# Patient Record
Sex: MALE | Race: WHITE | NOT HISPANIC OR LATINO | URBAN - METROPOLITAN AREA
[De-identification: names, ages, dates, MRNs, and addresses within clinical notes are randomized per-mention and may not be internally consistent; named-entity substitution may affect disease eponyms.]

---

## 2017-01-05 ENCOUNTER — EMERGENCY (EMERGENCY)
Facility: HOSPITAL | Age: 57
LOS: 1 days | Discharge: HOME | End: 2017-01-05
Attending: EMERGENCY MEDICINE | Admitting: EMERGENCY MEDICINE
Payer: MEDICARE

## 2017-01-05 VITALS
TEMPERATURE: 98 F | OXYGEN SATURATION: 98 % | HEART RATE: 82 BPM | RESPIRATION RATE: 17 BRPM | SYSTOLIC BLOOD PRESSURE: 152 MMHG | DIASTOLIC BLOOD PRESSURE: 79 MMHG

## 2017-01-05 DIAGNOSIS — Z79.899 OTHER LONG TERM (CURRENT) DRUG THERAPY: ICD-10-CM

## 2017-01-05 DIAGNOSIS — Z79.2 LONG TERM (CURRENT) USE OF ANTIBIOTICS: ICD-10-CM

## 2017-01-05 DIAGNOSIS — Z88.8 ALLERGY STATUS TO OTHER DRUGS, MEDICAMENTS AND BIOLOGICAL SUBSTANCES STATUS: ICD-10-CM

## 2017-01-05 DIAGNOSIS — M79.672 PAIN IN LEFT FOOT: ICD-10-CM

## 2017-01-05 DIAGNOSIS — L03.116 CELLULITIS OF LEFT LOWER LIMB: ICD-10-CM

## 2017-01-05 DIAGNOSIS — B20 HUMAN IMMUNODEFICIENCY VIRUS [HIV] DISEASE: ICD-10-CM

## 2017-01-05 PROCEDURE — 99282 EMERGENCY DEPT VISIT SF MDM: CPT

## 2017-01-05 NOTE — ED PROVIDER NOTE - OBJECTIVE STATEMENT
56y M Pt with PMHx HIV, HTN presents to ED with worsening pain in left 5th toe x3 days. Does not radiate. Blister appeared on lateral left 5th toe yesterday. Denies injury, drainage, and bleeding. Pt states glucose levels recently checked and normal. 56y M Pt with PMHx HIV, HTN presents to ED with worsening pain in left 5th toe x3 days. Does not radiate. Blister appeared on lateral left 5th toe yesterday. Denies injury, drainage, and bleeding. Pt states glucose levels recently checked and normal.  History of HIV, with undetectable viral load x many years.  Last CD4 was >500 as per patient.

## 2017-01-05 NOTE — ED PROVIDER NOTE - NS ED MD SCRIBE ATTENDING SCRIBE SECTIONS
PAST MEDICAL/SURGICAL/SOCIAL HISTORY/VITAL SIGNS( Pullset)/REVIEW OF SYSTEMS/HISTORY OF PRESENT ILLNESS/PHYSICAL EXAM/HIV

## 2017-01-05 NOTE — ED PROVIDER NOTE - SKIN, MLM
Small blistered lesion on lateral aspect of the base of the 5th toe with tenderness on the plantar surface. Mild erythema. Small blistered lesion on lateral aspect of the base of the 5th toe with tenderness on the plantar surface. Mild erythema.  Otherwise, no skin lesions.

## 2017-01-05 NOTE — ED ADULT NURSE NOTE - PMH
Avascular necrosis of bone of hip, right    Bipolar disorder    HIV (human immunodeficiency virus infection)    HTN (hypertension)

## 2017-01-05 NOTE — ED PROVIDER NOTE - MEDICAL DECISION MAKING DETAILS
Patient with painful blistered lesion and mild erythema.  Will treat with ABx, given location and pt with history of HIV/AIDS (thought good CD4 count now).

## 2017-01-22 ENCOUNTER — EMERGENCY (EMERGENCY)
Facility: HOSPITAL | Age: 57
LOS: 1 days | Discharge: HOME | End: 2017-01-22
Attending: EMERGENCY MEDICINE | Admitting: EMERGENCY MEDICINE
Payer: MEDICARE

## 2017-01-22 VITALS
TEMPERATURE: 98 F | RESPIRATION RATE: 16 BRPM | HEART RATE: 68 BPM | DIASTOLIC BLOOD PRESSURE: 86 MMHG | OXYGEN SATURATION: 97 % | SYSTOLIC BLOOD PRESSURE: 159 MMHG

## 2017-01-22 VITALS — WEIGHT: 190.04 LBS

## 2017-01-22 DIAGNOSIS — Z79.899 OTHER LONG TERM (CURRENT) DRUG THERAPY: ICD-10-CM

## 2017-01-22 DIAGNOSIS — Z79.2 LONG TERM (CURRENT) USE OF ANTIBIOTICS: ICD-10-CM

## 2017-01-22 DIAGNOSIS — M79.89 OTHER SPECIFIED SOFT TISSUE DISORDERS: ICD-10-CM

## 2017-01-22 DIAGNOSIS — B20 HUMAN IMMUNODEFICIENCY VIRUS [HIV] DISEASE: ICD-10-CM

## 2017-01-22 DIAGNOSIS — E78.5 HYPERLIPIDEMIA, UNSPECIFIED: ICD-10-CM

## 2017-01-22 DIAGNOSIS — R59.1 GENERALIZED ENLARGED LYMPH NODES: ICD-10-CM

## 2017-01-22 DIAGNOSIS — I10 ESSENTIAL (PRIMARY) HYPERTENSION: ICD-10-CM

## 2017-01-22 DIAGNOSIS — Z88.8 ALLERGY STATUS TO OTHER DRUGS, MEDICAMENTS AND BIOLOGICAL SUBSTANCES STATUS: ICD-10-CM

## 2017-01-22 PROCEDURE — 99282 EMERGENCY DEPT VISIT SF MDM: CPT

## 2017-01-22 NOTE — ED PROVIDER NOTE - OBJECTIVE STATEMENT
57 y/o male pt, hx of HIV, HTN, hyperlipidemia, on meds, with several days of R groin swollen lymph node. Saw a dr beforehand, told he needed primaryc are follow up and probably a node biopsy, here to be "checked out" and get a second opinion, wants primary care info as his PMD is out of town. PT has had no leg swelling, no calf pain today, no fever, no chills, no evan pain, no SOB, no recent infections, no abd pain, no nausea, no vomiting. Hx of lipomas

## 2017-01-22 NOTE — ED PROVIDER NOTE - MEDICAL DECISION MAKING DETAILS
isolated lymphadenopathy, no signs of leg cellulitis, no swelling, no concern for DVT at this time, pt is active and exercised this AM, no distress, will provide info for pimary care. needs work up for lymphadenopathy, does not seem related to any acute infectious process.

## 2017-02-06 ENCOUNTER — APPOINTMENT (OUTPATIENT)
Dept: INTERNAL MEDICINE | Facility: CLINIC | Age: 57
End: 2017-02-06

## 2017-05-16 ENCOUNTER — EMERGENCY (EMERGENCY)
Facility: HOSPITAL | Age: 57
LOS: 1 days | Discharge: HOME | End: 2017-05-16
Attending: EMERGENCY MEDICINE | Admitting: EMERGENCY MEDICINE
Payer: MEDICARE

## 2017-05-16 VITALS
OXYGEN SATURATION: 97 % | WEIGHT: 195.11 LBS | RESPIRATION RATE: 16 BRPM | DIASTOLIC BLOOD PRESSURE: 81 MMHG | TEMPERATURE: 98 F | SYSTOLIC BLOOD PRESSURE: 134 MMHG | HEART RATE: 70 BPM | HEIGHT: 70 IN

## 2017-05-16 DIAGNOSIS — Z79.2 LONG TERM (CURRENT) USE OF ANTIBIOTICS: ICD-10-CM

## 2017-05-16 DIAGNOSIS — E78.5 HYPERLIPIDEMIA, UNSPECIFIED: ICD-10-CM

## 2017-05-16 DIAGNOSIS — B20 HUMAN IMMUNODEFICIENCY VIRUS [HIV] DISEASE: ICD-10-CM

## 2017-05-16 DIAGNOSIS — J02.9 ACUTE PHARYNGITIS, UNSPECIFIED: ICD-10-CM

## 2017-05-16 DIAGNOSIS — I10 ESSENTIAL (PRIMARY) HYPERTENSION: ICD-10-CM

## 2017-05-16 DIAGNOSIS — Z79.899 OTHER LONG TERM (CURRENT) DRUG THERAPY: ICD-10-CM

## 2017-05-16 DIAGNOSIS — Z88.8 ALLERGY STATUS TO OTHER DRUGS, MEDICAMENTS AND BIOLOGICAL SUBSTANCES STATUS: ICD-10-CM

## 2017-05-16 DIAGNOSIS — J02.0 STREPTOCOCCAL PHARYNGITIS: ICD-10-CM

## 2017-05-16 LAB — S PYO AG SPEC QL IA: NEGATIVE — SIGNIFICANT CHANGE UP

## 2017-05-16 PROCEDURE — 99283 EMERGENCY DEPT VISIT LOW MDM: CPT

## 2017-05-16 RX ORDER — AZITHROMYCIN 500 MG/1
500 TABLET, FILM COATED ORAL ONCE
Qty: 0 | Refills: 0 | Status: COMPLETED | OUTPATIENT
Start: 2017-05-16 | End: 2017-05-16

## 2017-05-16 RX ORDER — DEXAMETHASONE 0.5 MG/5ML
4 ELIXIR ORAL ONCE
Qty: 0 | Refills: 0 | Status: COMPLETED | OUTPATIENT
Start: 2017-05-16 | End: 2017-05-16

## 2017-05-16 RX ORDER — IBUPROFEN 200 MG
1 TABLET ORAL
Qty: 15 | Refills: 0 | OUTPATIENT
Start: 2017-05-16 | End: 2017-05-19

## 2017-05-16 RX ORDER — IBUPROFEN 200 MG
600 TABLET ORAL ONCE
Qty: 0 | Refills: 0 | Status: COMPLETED | OUTPATIENT
Start: 2017-05-16 | End: 2017-05-16

## 2017-05-16 RX ORDER — AZITHROMYCIN 500 MG/1
1 TABLET, FILM COATED ORAL
Qty: 3 | Refills: 0 | OUTPATIENT
Start: 2017-05-16 | End: 2017-05-19

## 2017-05-16 RX ADMIN — Medication 600 MILLIGRAM(S): at 15:32

## 2017-05-16 RX ADMIN — AZITHROMYCIN 500 MILLIGRAM(S): 500 TABLET, FILM COATED ORAL at 15:32

## 2017-05-16 RX ADMIN — Medication 4 MILLIGRAM(S): at 15:32

## 2017-05-16 NOTE — ED ADULT NURSE NOTE - CHPI ED SYMPTOMS NEG
no syncope/no vomiting/no change in level of consciousness/no chills/no nausea/no weakness/no loss of consciousness/no blurred vision/no numbness

## 2017-05-16 NOTE — ED PROVIDER NOTE - ENMT, MLM
Airway patent, Nasal mucosa clear. Mouth with normal mucosa. Throat has no vesicles, slight erythematous b/l posterior pharynx with no fluctuance, vesicles, or edema, no thrush noted, airway patent, no drooling, phonating well

## 2017-05-16 NOTE — ED ADULT NURSE NOTE - OBJECTIVE STATEMENT
Pt came in c/o sore throat and difficulty swallowing x1day. Pt denies any CP/SOB/cough. Reports fever of 101.3 last night, took 650mg of Tylenol which helped. Pt is HIV positive, last Tcell count was 1066. Pt denies any N/V/D. Back of throat looks asymptomatic, no edema or redness present at this time, pt does not have tonsils.

## 2017-05-16 NOTE — ED PROVIDER NOTE - MEDICAL DECISION MAKING DETAILS
pt with sore throat, +fever, absence of cough, and +enlarged cervical LN, consistent with strep pharyngitis, given dose of azithromycin and motrin, AFVSS at time of d/c, pt non-toxic appearing and hemodynamically stable, results, ddx, and f/u plans discussed with pt at bedside, d/c'd home to f/u with PMD/ENT, strict return precautions discussed, prompt return to ER for any worsening or new sx, pt verbalized understanding.

## 2017-05-16 NOTE — ED PROVIDER NOTE - OBJECTIVE STATEMENT
56 yo M with PMHx of HIV, last CD4 1099, VL undetectable on HAART, HTN, HLD, and AVN s/p hip replacement, presenting c/o sore throat x 2d 58 yo M with PMHx of HIV, last CD4 1099, VL undetectable on HAART, HTN, HLD, and AVN s/p hip replacement, presenting c/o sore throat x 2d. Pt reports swimming in a pool few days ago, started developing a sore throat one day later with fever, Tmax 101.5, chills, and pain with swallowing.  Pain is worse this morning.  Denies cough, change in phonation, drooling, stridors, SOB, CP, palpitations, wheezing, abdominal pain, N/V/D/C, change in urinary/bowel function, rash and malaise.  No recent sick contact noted

## 2017-05-18 LAB
CULTURE RESULTS: SIGNIFICANT CHANGE UP
SPECIMEN SOURCE: SIGNIFICANT CHANGE UP

## 2017-06-21 ENCOUNTER — EMERGENCY (EMERGENCY)
Facility: HOSPITAL | Age: 57
LOS: 1 days | Discharge: HOME | End: 2017-06-21
Attending: EMERGENCY MEDICINE | Admitting: EMERGENCY MEDICINE
Payer: MEDICARE

## 2017-06-21 VITALS
OXYGEN SATURATION: 99 % | TEMPERATURE: 99 F | HEART RATE: 76 BPM | DIASTOLIC BLOOD PRESSURE: 77 MMHG | SYSTOLIC BLOOD PRESSURE: 134 MMHG | RESPIRATION RATE: 18 BRPM

## 2017-06-21 DIAGNOSIS — Z79.1 LONG TERM (CURRENT) USE OF NON-STEROIDAL ANTI-INFLAMMATORIES (NSAID): ICD-10-CM

## 2017-06-21 DIAGNOSIS — J20.9 ACUTE BRONCHITIS, UNSPECIFIED: ICD-10-CM

## 2017-06-21 DIAGNOSIS — Z79.2 LONG TERM (CURRENT) USE OF ANTIBIOTICS: ICD-10-CM

## 2017-06-21 DIAGNOSIS — B20 HUMAN IMMUNODEFICIENCY VIRUS [HIV] DISEASE: ICD-10-CM

## 2017-06-21 DIAGNOSIS — I10 ESSENTIAL (PRIMARY) HYPERTENSION: ICD-10-CM

## 2017-06-21 DIAGNOSIS — Z79.899 OTHER LONG TERM (CURRENT) DRUG THERAPY: ICD-10-CM

## 2017-06-21 DIAGNOSIS — R05 COUGH: ICD-10-CM

## 2017-06-21 DIAGNOSIS — Z88.8 ALLERGY STATUS TO OTHER DRUGS, MEDICAMENTS AND BIOLOGICAL SUBSTANCES STATUS: ICD-10-CM

## 2017-06-21 PROCEDURE — 71020: CPT | Mod: 26

## 2017-06-21 PROCEDURE — 93010 ELECTROCARDIOGRAM REPORT: CPT

## 2017-06-21 PROCEDURE — 99284 EMERGENCY DEPT VISIT MOD MDM: CPT | Mod: 25

## 2017-06-21 RX ORDER — AZITHROMYCIN 500 MG/1
1 TABLET, FILM COATED ORAL
Qty: 3 | Refills: 0 | OUTPATIENT
Start: 2017-06-21 | End: 2017-06-24

## 2017-06-21 RX ORDER — ALBUTEROL 90 UG/1
2 AEROSOL, METERED ORAL
Qty: 1 | Refills: 0 | OUTPATIENT
Start: 2017-06-21 | End: 2017-07-21

## 2017-06-21 RX ORDER — LORATADINE 10 MG/1
1 TABLET ORAL
Qty: 10 | Refills: 0 | OUTPATIENT
Start: 2017-06-21 | End: 2017-07-01

## 2017-06-21 NOTE — ED PROVIDER NOTE - OBJECTIVE STATEMENT
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

## 2017-06-26 ENCOUNTER — INPATIENT (INPATIENT)
Facility: HOSPITAL | Age: 57
LOS: 3 days | Discharge: HOME | DRG: 165 | End: 2017-06-30
Attending: THORACIC SURGERY (CARDIOTHORACIC VASCULAR SURGERY) | Admitting: THORACIC SURGERY (CARDIOTHORACIC VASCULAR SURGERY)
Payer: MEDICARE

## 2017-06-26 VITALS
TEMPERATURE: 99 F | SYSTOLIC BLOOD PRESSURE: 137 MMHG | OXYGEN SATURATION: 97 % | DIASTOLIC BLOOD PRESSURE: 86 MMHG | RESPIRATION RATE: 20 BRPM | HEART RATE: 73 BPM | WEIGHT: 195.99 LBS

## 2017-06-26 DIAGNOSIS — Z98.890 OTHER SPECIFIED POSTPROCEDURAL STATES: Chronic | ICD-10-CM

## 2017-06-26 LAB
ALBUMIN SERPL ELPH-MCNC: 3.8 G/DL — SIGNIFICANT CHANGE UP (ref 3.3–5)
ALBUMIN SERPL ELPH-MCNC: 4.5 G/DL — SIGNIFICANT CHANGE UP (ref 3.4–5)
ALP SERPL-CCNC: 51 U/L — SIGNIFICANT CHANGE UP (ref 40–120)
ALP SERPL-CCNC: 58 U/L — SIGNIFICANT CHANGE UP (ref 40–120)
ALT FLD-CCNC: 16 U/L — SIGNIFICANT CHANGE UP (ref 10–45)
ALT FLD-CCNC: 29 U/L — SIGNIFICANT CHANGE UP (ref 12–42)
ANION GAP SERPL CALC-SCNC: 12 MMOL/L — SIGNIFICANT CHANGE UP (ref 5–17)
ANION GAP SERPL CALC-SCNC: 9 MMOL/L — SIGNIFICANT CHANGE UP (ref 9–16)
APPEARANCE UR: CLEAR — SIGNIFICANT CHANGE UP
APTT BLD: 33.4 SEC — SIGNIFICANT CHANGE UP (ref 27.5–37.4)
AST SERPL-CCNC: 21 U/L — SIGNIFICANT CHANGE UP (ref 10–40)
AST SERPL-CCNC: 27 U/L — SIGNIFICANT CHANGE UP (ref 15–37)
BASOPHILS NFR BLD AUTO: 0.7 % — SIGNIFICANT CHANGE UP (ref 0–2)
BILIRUB SERPL-MCNC: 0.5 MG/DL — SIGNIFICANT CHANGE UP (ref 0.2–1.2)
BILIRUB SERPL-MCNC: 0.8 MG/DL — SIGNIFICANT CHANGE UP (ref 0.2–1.2)
BILIRUB UR-MCNC: NEGATIVE — SIGNIFICANT CHANGE UP
BLD GP AB SCN SERPL QL: NEGATIVE — SIGNIFICANT CHANGE UP
BUN SERPL-MCNC: 18 MG/DL — SIGNIFICANT CHANGE UP (ref 7–23)
BUN SERPL-MCNC: 24 MG/DL — HIGH (ref 7–23)
CALCIUM SERPL-MCNC: 9.1 MG/DL — SIGNIFICANT CHANGE UP (ref 8.4–10.5)
CALCIUM SERPL-MCNC: 9.6 MG/DL — SIGNIFICANT CHANGE UP (ref 8.5–10.5)
CHLORIDE SERPL-SCNC: 100 MMOL/L — SIGNIFICANT CHANGE UP (ref 96–108)
CHLORIDE SERPL-SCNC: 104 MMOL/L — SIGNIFICANT CHANGE UP (ref 96–108)
CHOLEST SERPL-MCNC: 146 MG/DL — SIGNIFICANT CHANGE UP (ref 10–199)
CO2 SERPL-SCNC: 26 MMOL/L — SIGNIFICANT CHANGE UP (ref 22–31)
CO2 SERPL-SCNC: 28 MMOL/L — SIGNIFICANT CHANGE UP (ref 22–31)
COLOR SPEC: YELLOW — SIGNIFICANT CHANGE UP
CREAT SERPL-MCNC: 1.3 MG/DL — SIGNIFICANT CHANGE UP (ref 0.5–1.3)
CREAT SERPL-MCNC: 1.67 MG/DL — HIGH (ref 0.5–1.3)
DIFF PNL FLD: NEGATIVE — SIGNIFICANT CHANGE UP
EOSINOPHIL NFR BLD AUTO: 2.4 % — SIGNIFICANT CHANGE UP (ref 0–6)
GLUCOSE SERPL-MCNC: 121 MG/DL — HIGH (ref 70–99)
GLUCOSE SERPL-MCNC: 122 MG/DL — HIGH (ref 70–99)
GLUCOSE UR QL: NEGATIVE — SIGNIFICANT CHANGE UP
HBA1C BLD-MCNC: 5.3 % — SIGNIFICANT CHANGE UP (ref 4–5.6)
HCT VFR BLD CALC: 42.3 % — SIGNIFICANT CHANGE UP (ref 39–50)
HCT VFR BLD CALC: 45 % — SIGNIFICANT CHANGE UP (ref 39–50)
HDLC SERPL-MCNC: 33 MG/DL — LOW (ref 40–125)
HGB BLD-MCNC: 14.4 G/DL — SIGNIFICANT CHANGE UP (ref 13–17)
HGB BLD-MCNC: 15.7 G/DL — SIGNIFICANT CHANGE UP (ref 13–17)
IMM GRANULOCYTES NFR BLD AUTO: 0.5 % — SIGNIFICANT CHANGE UP (ref 0–1.5)
INR BLD: 1.02 — SIGNIFICANT CHANGE UP (ref 0.88–1.16)
INR BLD: 1.02 — SIGNIFICANT CHANGE UP (ref 0.88–1.16)
KETONES UR-MCNC: NEGATIVE — SIGNIFICANT CHANGE UP
LACTATE SERPL-SCNC: 1.3 MMOL/L — SIGNIFICANT CHANGE UP (ref 0.4–2)
LDH SERPL L TO P-CCNC: 205 U/L — SIGNIFICANT CHANGE UP (ref 50–242)
LEUKOCYTE ESTERASE UR-ACNC: NEGATIVE — SIGNIFICANT CHANGE UP
LIPID PNL WITH DIRECT LDL SERPL: 74 MG/DL — SIGNIFICANT CHANGE UP
LYMPHOCYTES # BLD AUTO: 36.3 % — SIGNIFICANT CHANGE UP (ref 13–44)
MAGNESIUM SERPL-MCNC: 2 MG/DL — SIGNIFICANT CHANGE UP (ref 1.6–2.6)
MCHC RBC-ENTMCNC: 28.8 PG — SIGNIFICANT CHANGE UP (ref 27–34)
MCHC RBC-ENTMCNC: 29.8 PG — SIGNIFICANT CHANGE UP (ref 27–34)
MCHC RBC-ENTMCNC: 34 G/DL — SIGNIFICANT CHANGE UP (ref 32–36)
MCHC RBC-ENTMCNC: 34.9 G/DL — SIGNIFICANT CHANGE UP (ref 32–36)
MCV RBC AUTO: 84.6 FL — SIGNIFICANT CHANGE UP (ref 80–100)
MCV RBC AUTO: 85.6 FL — SIGNIFICANT CHANGE UP (ref 80–100)
MONOCYTES NFR BLD AUTO: 8.8 % — SIGNIFICANT CHANGE UP (ref 2–14)
NEUTROPHILS NFR BLD AUTO: 51.3 % — SIGNIFICANT CHANGE UP (ref 43–77)
NITRITE UR-MCNC: NEGATIVE — SIGNIFICANT CHANGE UP
NT-PROBNP SERPL-SCNC: 62 PG/ML — SIGNIFICANT CHANGE UP (ref 0–300)
NT-PROBNP SERPL-SCNC: 79 PG/ML — SIGNIFICANT CHANGE UP
PH UR: 6.5 — SIGNIFICANT CHANGE UP (ref 5–8)
PHOSPHATE SERPL-MCNC: 2.7 MG/DL — SIGNIFICANT CHANGE UP (ref 2.5–4.5)
PLATELET # BLD AUTO: 208 K/UL — SIGNIFICANT CHANGE UP (ref 150–400)
PLATELET # BLD AUTO: 250 K/UL — SIGNIFICANT CHANGE UP (ref 150–400)
POTASSIUM SERPL-MCNC: 3.5 MMOL/L — SIGNIFICANT CHANGE UP (ref 3.5–5.3)
POTASSIUM SERPL-MCNC: 3.7 MMOL/L — SIGNIFICANT CHANGE UP (ref 3.5–5.3)
POTASSIUM SERPL-SCNC: 3.5 MMOL/L — SIGNIFICANT CHANGE UP (ref 3.5–5.3)
POTASSIUM SERPL-SCNC: 3.7 MMOL/L — SIGNIFICANT CHANGE UP (ref 3.5–5.3)
PROT SERPL-MCNC: 6.4 G/DL — SIGNIFICANT CHANGE UP (ref 6–8.3)
PROT SERPL-MCNC: 8.3 G/DL — HIGH (ref 6.4–8.2)
PROT UR-MCNC: NEGATIVE MG/DL — SIGNIFICANT CHANGE UP
PROTHROM AB SERPL-ACNC: 11.2 SEC — SIGNIFICANT CHANGE UP (ref 9.8–12.7)
PROTHROM AB SERPL-ACNC: 11.3 SEC — SIGNIFICANT CHANGE UP (ref 9.8–12.7)
RBC # BLD: 5 M/UL — SIGNIFICANT CHANGE UP (ref 4.2–5.8)
RBC # BLD: 5.26 M/UL — SIGNIFICANT CHANGE UP (ref 4.2–5.8)
RBC # FLD: 12.2 % — SIGNIFICANT CHANGE UP (ref 10.3–16.9)
RBC # FLD: 12.5 % — SIGNIFICANT CHANGE UP (ref 10.3–16.9)
RH IG SCN BLD-IMP: POSITIVE — SIGNIFICANT CHANGE UP
SODIUM SERPL-SCNC: 138 MMOL/L — SIGNIFICANT CHANGE UP (ref 135–145)
SODIUM SERPL-SCNC: 141 MMOL/L — SIGNIFICANT CHANGE UP (ref 132–145)
SP GR SPEC: 1.01 — SIGNIFICANT CHANGE UP (ref 1–1.03)
TOTAL CHOLESTEROL/HDL RATIO MEASUREMENT: 4.4 RATIO — SIGNIFICANT CHANGE UP (ref 3.4–9.6)
TRIGL SERPL-MCNC: 197 MG/DL — HIGH (ref 10–149)
TROPONIN I SERPL-MCNC: 0.02 NG/ML — SIGNIFICANT CHANGE UP (ref 0.02–0.06)
TSH SERPL-MCNC: 1.1 UIU/ML — SIGNIFICANT CHANGE UP (ref 0.35–4.94)
UROBILINOGEN FLD QL: 0.2 E.U./DL — SIGNIFICANT CHANGE UP
WBC # BLD: 8.7 K/UL — SIGNIFICANT CHANGE UP (ref 3.8–10.5)
WBC # BLD: 9.6 K/UL — SIGNIFICANT CHANGE UP (ref 3.8–10.5)
WBC # FLD AUTO: 8.7 K/UL — SIGNIFICANT CHANGE UP (ref 3.8–10.5)
WBC # FLD AUTO: 9.6 K/UL — SIGNIFICANT CHANGE UP (ref 3.8–10.5)

## 2017-06-26 PROCEDURE — 32551 INSERTION OF CHEST TUBE: CPT

## 2017-06-26 PROCEDURE — 71010: CPT | Mod: 26,59

## 2017-06-26 PROCEDURE — 93010 ELECTROCARDIOGRAM REPORT: CPT

## 2017-06-26 PROCEDURE — 71020: CPT | Mod: 26

## 2017-06-26 PROCEDURE — 71020: CPT | Mod: 26,59

## 2017-06-26 PROCEDURE — 99152 MOD SED SAME PHYS/QHP 5/>YRS: CPT | Mod: 59

## 2017-06-26 PROCEDURE — 71250 CT THORAX DX C-: CPT | Mod: 26

## 2017-06-26 PROCEDURE — 99285 EMERGENCY DEPT VISIT HI MDM: CPT | Mod: 25

## 2017-06-26 RX ORDER — HYDROMORPHONE HYDROCHLORIDE 2 MG/ML
0.5 INJECTION INTRAMUSCULAR; INTRAVENOUS; SUBCUTANEOUS EVERY 4 HOURS
Qty: 0 | Refills: 0 | Status: DISCONTINUED | OUTPATIENT
Start: 2017-06-26 | End: 2017-06-27

## 2017-06-26 RX ORDER — IBUPROFEN 200 MG
600 TABLET ORAL EVERY 6 HOURS
Qty: 0 | Refills: 0 | Status: DISCONTINUED | OUTPATIENT
Start: 2017-06-26 | End: 2017-06-28

## 2017-06-26 RX ORDER — RITONAVIR 100 MG/1
100 TABLET, FILM COATED ORAL DAILY
Qty: 0 | Refills: 0 | Status: DISCONTINUED | OUTPATIENT
Start: 2017-06-26 | End: 2017-06-28

## 2017-06-26 RX ORDER — HYDROMORPHONE HYDROCHLORIDE 2 MG/ML
0.5 INJECTION INTRAMUSCULAR; INTRAVENOUS; SUBCUTANEOUS ONCE
Qty: 0 | Refills: 0 | Status: DISCONTINUED | OUTPATIENT
Start: 2017-06-26 | End: 2017-06-26

## 2017-06-26 RX ORDER — ATENOLOL 25 MG/1
50 TABLET ORAL DAILY
Qty: 0 | Refills: 0 | Status: DISCONTINUED | OUTPATIENT
Start: 2017-06-26 | End: 2017-06-28

## 2017-06-26 RX ORDER — PANTOPRAZOLE SODIUM 20 MG/1
40 TABLET, DELAYED RELEASE ORAL
Qty: 0 | Refills: 0 | Status: DISCONTINUED | OUTPATIENT
Start: 2017-06-26 | End: 2017-06-28

## 2017-06-26 RX ORDER — DOLUTEGRAVIR SODIUM 25 MG/1
50 TABLET, FILM COATED ORAL DAILY
Qty: 0 | Refills: 0 | Status: DISCONTINUED | OUTPATIENT
Start: 2017-06-26 | End: 2017-06-28

## 2017-06-26 RX ORDER — DARUNAVIR 75 MG/1
800 TABLET, FILM COATED ORAL DAILY
Qty: 0 | Refills: 0 | Status: DISCONTINUED | OUTPATIENT
Start: 2017-06-26 | End: 2017-06-28

## 2017-06-26 RX ORDER — LIDOCAINE 4 G/100G
1 CREAM TOPICAL EVERY 24 HOURS
Qty: 0 | Refills: 0 | Status: DISCONTINUED | OUTPATIENT
Start: 2017-06-26 | End: 2017-06-28

## 2017-06-26 RX ORDER — MORPHINE SULFATE 50 MG/1
4 CAPSULE, EXTENDED RELEASE ORAL ONCE
Qty: 0 | Refills: 0 | Status: DISCONTINUED | OUTPATIENT
Start: 2017-06-26 | End: 2017-06-26

## 2017-06-26 RX ORDER — PROPOFOL 10 MG/ML
50 INJECTION, EMULSION INTRAVENOUS ONCE
Qty: 0 | Refills: 0 | Status: COMPLETED | OUTPATIENT
Start: 2017-06-26 | End: 2017-06-26

## 2017-06-26 RX ORDER — SODIUM CHLORIDE 9 MG/ML
3 INJECTION INTRAMUSCULAR; INTRAVENOUS; SUBCUTANEOUS EVERY 8 HOURS
Qty: 0 | Refills: 0 | Status: DISCONTINUED | OUTPATIENT
Start: 2017-06-26 | End: 2017-06-28

## 2017-06-26 RX ORDER — LAMOTRIGINE 25 MG/1
300 TABLET, ORALLY DISINTEGRATING ORAL DAILY
Qty: 0 | Refills: 0 | Status: DISCONTINUED | OUTPATIENT
Start: 2017-06-26 | End: 2017-06-28

## 2017-06-26 RX ORDER — DIPHENHYDRAMINE HCL 50 MG
25 CAPSULE ORAL ONCE
Qty: 0 | Refills: 0 | Status: COMPLETED | OUTPATIENT
Start: 2017-06-26 | End: 2017-06-26

## 2017-06-26 RX ORDER — SPIRONOLACTONE 25 MG/1
25 TABLET, FILM COATED ORAL DAILY
Qty: 0 | Refills: 0 | Status: DISCONTINUED | OUTPATIENT
Start: 2017-06-26 | End: 2017-06-28

## 2017-06-26 RX ORDER — POTASSIUM CHLORIDE 20 MEQ
20 PACKET (EA) ORAL ONCE
Qty: 0 | Refills: 0 | Status: COMPLETED | OUTPATIENT
Start: 2017-06-26 | End: 2017-06-26

## 2017-06-26 RX ORDER — MIDAZOLAM HYDROCHLORIDE 1 MG/ML
2.5 INJECTION, SOLUTION INTRAMUSCULAR; INTRAVENOUS ONCE
Qty: 0 | Refills: 0 | Status: DISCONTINUED | OUTPATIENT
Start: 2017-06-26 | End: 2017-06-26

## 2017-06-26 RX ORDER — BUPRENORPHINE AND NALOXONE 2; .5 MG/1; MG/1
1 TABLET SUBLINGUAL DAILY
Qty: 0 | Refills: 0 | Status: DISCONTINUED | OUTPATIENT
Start: 2017-06-26 | End: 2017-06-28

## 2017-06-26 RX ORDER — HEPARIN SODIUM 5000 [USP'U]/ML
5000 INJECTION INTRAVENOUS; SUBCUTANEOUS EVERY 8 HOURS
Qty: 0 | Refills: 0 | Status: DISCONTINUED | OUTPATIENT
Start: 2017-06-26 | End: 2017-06-28

## 2017-06-26 RX ADMIN — HYDROMORPHONE HYDROCHLORIDE 0.5 MILLIGRAM(S): 2 INJECTION INTRAMUSCULAR; INTRAVENOUS; SUBCUTANEOUS at 22:16

## 2017-06-26 RX ADMIN — LIDOCAINE 1 PATCH: 4 CREAM TOPICAL at 14:12

## 2017-06-26 RX ADMIN — MORPHINE SULFATE 4 MILLIGRAM(S): 50 CAPSULE, EXTENDED RELEASE ORAL at 09:21

## 2017-06-26 RX ADMIN — HYDROMORPHONE HYDROCHLORIDE 0.5 MILLIGRAM(S): 2 INJECTION INTRAMUSCULAR; INTRAVENOUS; SUBCUTANEOUS at 17:25

## 2017-06-26 RX ADMIN — SODIUM CHLORIDE 3 MILLILITER(S): 9 INJECTION INTRAMUSCULAR; INTRAVENOUS; SUBCUTANEOUS at 21:40

## 2017-06-26 RX ADMIN — SODIUM CHLORIDE 3 MILLILITER(S): 9 INJECTION INTRAMUSCULAR; INTRAVENOUS; SUBCUTANEOUS at 14:06

## 2017-06-26 RX ADMIN — MIDAZOLAM HYDROCHLORIDE 2.5 MILLIGRAM(S): 1 INJECTION, SOLUTION INTRAMUSCULAR; INTRAVENOUS at 08:53

## 2017-06-26 RX ADMIN — HYDROMORPHONE HYDROCHLORIDE 0.5 MILLIGRAM(S): 2 INJECTION INTRAMUSCULAR; INTRAVENOUS; SUBCUTANEOUS at 11:09

## 2017-06-26 RX ADMIN — HYDROMORPHONE HYDROCHLORIDE 0.5 MILLIGRAM(S): 2 INJECTION INTRAMUSCULAR; INTRAVENOUS; SUBCUTANEOUS at 09:47

## 2017-06-26 RX ADMIN — MIDAZOLAM HYDROCHLORIDE 2.5 MILLIGRAM(S): 1 INJECTION, SOLUTION INTRAMUSCULAR; INTRAVENOUS at 09:00

## 2017-06-26 RX ADMIN — Medication 20 MILLIEQUIVALENT(S): at 21:15

## 2017-06-26 RX ADMIN — Medication 25 MILLIGRAM(S): at 21:15

## 2017-06-26 RX ADMIN — HYDROMORPHONE HYDROCHLORIDE 0.5 MILLIGRAM(S): 2 INJECTION INTRAMUSCULAR; INTRAVENOUS; SUBCUTANEOUS at 10:20

## 2017-06-26 RX ADMIN — HYDROMORPHONE HYDROCHLORIDE 0.5 MILLIGRAM(S): 2 INJECTION INTRAMUSCULAR; INTRAVENOUS; SUBCUTANEOUS at 18:29

## 2017-06-26 RX ADMIN — HEPARIN SODIUM 5000 UNIT(S): 5000 INJECTION INTRAVENOUS; SUBCUTANEOUS at 21:15

## 2017-06-26 RX ADMIN — HYDROMORPHONE HYDROCHLORIDE 0.5 MILLIGRAM(S): 2 INJECTION INTRAMUSCULAR; INTRAVENOUS; SUBCUTANEOUS at 22:40

## 2017-06-26 RX ADMIN — MORPHINE SULFATE 4 MILLIGRAM(S): 50 CAPSULE, EXTENDED RELEASE ORAL at 10:20

## 2017-06-26 RX ADMIN — PROPOFOL 50 MILLIGRAM(S): 10 INJECTION, EMULSION INTRAVENOUS at 09:03

## 2017-06-26 NOTE — H&P ADULT - ASSESSMENT
Assessment  57 year old gentleman, a remote smoker 17 years ago, ETOH/Crack/Marijuana 30years ago with a past medical history pertinent for chronic bronchitis, HLD, HTN, GERD, Mac's Esophagus, Anxiety, Bipolar disorder, narcotics dependent secondary to laminectomy x2 (2001), R. hip surgery x3 (2014) secondary to staph infection/vascular necrosis, s/p carpel tunnel surgery feb/march 2017 who reported he was being treated for bronchitis with a Z-Pac since last thursday 6/22/17 and became acutely SOB while climbing stairs today. He presented to [facility] and CXR revealed he had a large left pneumothorax for which a pigtail was placed.  Patient was transferred to [facility] for further evaluation.    Plan:  Neurovascular:   -Pain well control with suboxone 2mg daily, dilaudid 0.5mg prn      Cardiovascular:   Hemodynamically stable.   -BP. HR. EKG. TTE. Cardiac Panel. Lipid Panel. BNP.   -restart atenolol, thiazide      Respiratory:   02 Sat = 98% on RA.  -cont chest tube on suction  -am CXR     GI: Stable.  -PPX.  -PO Diet.    Renal / :  -Monitor renal function.  -Monitor I/O's.    Endocrine:    -A1c.  -TSH.    Hematologic:  -CBC  -coags    ID:  -trend wbc    Prophylaxis:  -DVT prophylaxis with 5000 SubQ Heparin q8h.  -SCD's    Disposition:  -Possible surgical intervention if PTx does not resolve

## 2017-06-26 NOTE — H&P ADULT - NSHPREVIEWOFSYSTEMS_GEN_ALL_CORE
Review of Systems  CONSTITUTIONAL:  Denies Fevers / chills, sweats, fatigue, weight loss, weight gain                                      NEURO:  Admits parathesias (b/l hands prior to carpel tunnel surgery) Denies seizures, syncope, confusion                               EYES:  Denies Blurry vision, discharge, pain, loss of vision                                                                                    ENMT:  Denies Difficulty hearing, vertigo, dysphagia, epistaxis, recent dental work                                       CV:  Admits KLINE, Denies Chest pain, palpitations, , orthopnea                                                                                          RESPIRATORY:  Admits to SOB,; Denies Wheezing, cough / sputum, hemoptysis                                                                GI:  Denies Nausea, vomiting, diarrhea, constipation, melena, difficulty swallowing                                               : Denies Hematuria, dysuria, urgency, incontinence                                                                                         MUSKULOSKELETAL: Admits arthritis b/l shoulders, right hip and right knee, joint swelling, denies muscle weakness                 SKIN/BREAST:  Denies rash, itching, mary loss, masses                                                                                            PSYCH:  Admits depresion, anxiety, suicidal ideation  (2003)                                                                                               HEME/LYMPH:  Denies bruises easily, enlarged lymph nodes, tender lymph nodes                                        ENDOCRINE:  Denies cold intolerance, heat intolerance, polydipsia Review of Systems  CONSTITUTIONAL:  Denies Fevers / chills, sweats, fatigue, weight loss, weight gain                                      NEURO:  Admits parathesias (b/l hands prior to carpel tunnel surgery) Denies seizures, syncope, confusion                                EYES:  Denies Blurry vision, discharge, pain, loss of vision                                                                                    ENMT:  Denies Difficulty hearing, vertigo, dysphagia, epistaxis, recent dental work                                       CV:  Admits KLINE, Denies Chest pain, palpitations, , orthopnea                                                                                          RESPIRATORY:  Admits to SOB,; Denies Wheezing, cough / sputum, hemoptysis                                                                GI:  Denies Nausea, vomiting, diarrhea, constipation, melena, difficulty swallowing                                               : Denies Hematuria, dysuria, urgency, incontinence                                                                                         MUSKULOSKELETAL: Admits arthritis b/l shoulders, right hip and right knee, joint swelling, denies muscle weakness                   SKIN/BREAST:  Denies rash, itching, mary loss, masses                                                                                            PSYCH:  Admits depresion, anxiety, suicidal ideation  (2003)                                                                                               HEME/LYMPH:  Denies bruises easily, enlarged lymph nodes, tender lymph nodes                                        ENDOCRINE:  Denies cold intolerance, heat intolerance, polydipsia

## 2017-06-26 NOTE — ED ADULT NURSE REASSESSMENT NOTE - NS ED NURSE REASSESS COMMENT FT1
Patient presents to ED with shortness of breath, pt with spontaneous Left pneumothorax.  Pt s/p left chest tube insertion to 5th intercoastal space, to continuos suction. Chest tube insertion protocol followed. Vitals monitor. (see flow record) Site intact, no subcutaneus crepitus noted. Patient presents to ED with shortness of breath, pt with spontaneous Left pneumothorax.  Pt s/p left chest tube insertion to 5th intercoastal space, to continuos suction. Tolerated well. Chest tube insertion protocol followed. Vitals monitor. (see flow record) Site intact, no subcutaneus crepitus noted. Patient presents to ED with shortness of breath, pt with spontaneous Left pneumothorax.  Pt s/p left chest tube insertion to 5th intercoastal space, to continuos low suction. No drainage /output at this time. out Tolerated well. Chest tube insertion protocol followed. Vitals monitor. (see flow record) Site intact, no subcutaneus crepitus noted.

## 2017-06-26 NOTE — ED PROVIDER NOTE - ATTENDING CONTRIBUTION TO CARE
HIV + male with worsening cough, sob, chest pain/back pain x 4 days.  Seen here for symptoms last week - CXR neg, treated for bronchitis.  Today with total L lung collapse.  No VS derangements.  Speaking in full sentences.  No BS on the L side.  Consented for procedural sedation and pigtail placement.  Patient very anxious and with hx of multiple surgeries with "high pain tolerance" and former alcohol abuse.  Given versed for anxiolysis.  50 mg of propofol for sedation during procedure.  Tolerated well - see procedure note for detail.  Lung reinflated.  Hooked up to water seal.  Accepted by Dr. Curran for admission to CTS service.  Pain medication ordered for post procedure

## 2017-06-26 NOTE — H&P ADULT - HISTORY OF PRESENT ILLNESS
This is a 57 year old gentleman, a remote smoker 17 years ago, ETOH/Crack/Marijuana 30years ago with a past medical history pertinent for chronic bronchitis, HLD, HTN, GERD, Mac's Esophagus, Anxiety, Bipolar disorder, narcotics dependent secondary to laminectomy x2 (2001), R. hip surgery x3 (2014) secondary to staph infection/vascular necrosis, s/p carpel tunnel surgery feb/march 2017 who reported he was being treated for bronchitis with a Z-Pac since last thursday 6/22/17 and became acutely SOB while climbing stairs today. He presented to [facility] and CXR revealed he had a large left pneumothorax for which a pigtail was placed.  Patient was transferred to [facility] for further evaluation.

## 2017-06-26 NOTE — H&P ADULT - PSH
H/O carpal tunnel repair  2017  History of total hip replacement  x 3  Other postprocedural status  S/P laminectomy

## 2017-06-26 NOTE — ED PROCEDURE NOTE - CPROC ED CHEST TUBE DETAIL1
An incision into the lateral chest was made (see location above). A thoracostomy tube was placed into the pleural space (see size above), and connected to a closed drainage canister and water suction. The tube was secured with 00 nylon suture, and the area dressed with petrolatum impregnated gauze bandage. A post procedure chest x-ray was ordered, and proper placement was confirmed.

## 2017-06-26 NOTE — H&P ADULT - NSHPPHYSICALEXAM_GEN_ALL_CORE
Physical Exam  CONSTITUTIONAL:   Patient seen bedside in NAD                                                    NEURO: Alert and oriented with no gross deificit appreciated                                                                                            EYES: PERRL, EOM                                                                                 ENMT:  supple, no enlargement appreciarige  CV:RRR, S1S2, no murmur                                                                                   RESPIRATORY: Left lung with mild crackles, R lung field CTA, no wheeze, no rhonci, Left pigtail in place  GI:soft, nontender, positive bowel sounds                                                                                      : KRAUSE -non in place                                                                  MUSKULOSKELETAL: decrease ROM 2/2 pain b/l upper and lower extremities, no swelling appreciated                                  SKIN / BREAST:                                                                  WNL    Chest tube:on suction, positive intermittent airleak

## 2017-06-26 NOTE — ED PROVIDER NOTE - MEDICAL DECISION MAKING DETAILS
spontaneous pneumo 58 y/o M with pMH of HIV on HAART presents to ED c/o worsening SOB x 4 days with spontaneous pneumo on CXR.  Pt presented well, No tachypnea, accessory muscle use or air hunger.  Pigtail placed with resolution/reduction.  Pt tolerated well with moderate sedation.  Pt discussed with Dr. Mcgill, thoracic surg via transfer center and accepted.

## 2017-06-26 NOTE — H&P ADULT - NSHPLABSRESULTS_GEN_ALL_CORE
CXR 6/26/17 Left chest tube appearing. Left pneumothorax nearly completely resolved.  CT chest 6/26/17  Small residual left ptx, lateral upper lobe paraseptal blebs.

## 2017-06-26 NOTE — ED ADULT NURSE NOTE - OBJECTIVE STATEMENT
Patient complaining of worsening shortness of breath over the last 3 days, recently completed antibiotics with no relief

## 2017-06-26 NOTE — ED PROCEDURE NOTE - ATTENDING CONTRIBUTION TO CARE
pigtail for complete L lung collapse (spontaneous).  Admit to the CTS service at [facility].

## 2017-06-26 NOTE — H&P ADULT - PMH
Arthritis    Avascular necrosis of bone of hip, right    Hou's esophagus    Bipolar disorder    Bronchitis, chronic    Crack cocaine use  30 years ago  ETOH abuse  30 years ago  HIV (human immunodeficiency virus infection)    HTN (hypertension)    Marijuana abuse  30 years ago  Suicidal behavior with attempted self-injury  2003

## 2017-06-26 NOTE — ED PROVIDER NOTE - OBJECTIVE STATEMENT
56 y/o M presents to ED c/o 56 y/o M presents to ED c/o ac 58 y/o M presents to ED c/o worsening shortness of breath x 4 days.  Pt seen in this ED 4 days ago and diagnosed with bronchitis.  He took Zpak with no effect.  He states his SOB worsened after walking  flight of stairs yesterday.  He now has KLINE.  He has noted an upper back ache for 2-3 days which he thinks is due to a sebaceous cyst but states the pain radiates up around the shoulder and down the anterior chest.  He denies fevers/chills, n/v, abd pain, trauma/falls.

## 2017-06-26 NOTE — ED PROCEDURE NOTE - NS_POSTPROCCAREGUIDE_ED_ALL_ED
Patient is now fully awake, with vital signs and temperature stable, hydration is adequate, patients Raegan’s  score is at baseline (or greater than 8), patient and escort has received  discharge education.

## 2017-06-26 NOTE — ED PROVIDER NOTE - DIAGNOSTIC INTERPRETATION
CXR, 2 view:  complete pneumothorax on L side, no tracheal deviation, no cardiomegaly  CXR, portable, 1 view:  resolution/reduction of pneumothorax with pigtail in place.

## 2017-06-26 NOTE — ED ADULT TRIAGE NOTE - CHIEF COMPLAINT QUOTE
Pt reports finishing zithromax a few days ago cough. Complains of increasing shortness of breath over the past few days. Reports history of HIV.

## 2017-06-26 NOTE — ED PROVIDER NOTE - PROGRESS NOTE DETAILS
Pt describes remote history of alcohol abuse and high tolerance to pain medication.  Versed given for anxiolysis prior to procedure.

## 2017-06-27 LAB
ANION GAP SERPL CALC-SCNC: 14 MMOL/L — SIGNIFICANT CHANGE UP (ref 5–17)
BUN SERPL-MCNC: 18 MG/DL — SIGNIFICANT CHANGE UP (ref 7–23)
CALCIUM SERPL-MCNC: 10 MG/DL — SIGNIFICANT CHANGE UP (ref 8.4–10.5)
CHLORIDE SERPL-SCNC: 98 MMOL/L — SIGNIFICANT CHANGE UP (ref 96–108)
CO2 SERPL-SCNC: 25 MMOL/L — SIGNIFICANT CHANGE UP (ref 22–31)
CREAT SERPL-MCNC: 1.2 MG/DL — SIGNIFICANT CHANGE UP (ref 0.5–1.3)
GLUCOSE SERPL-MCNC: 100 MG/DL — HIGH (ref 70–99)
HCT VFR BLD CALC: 45.1 % — SIGNIFICANT CHANGE UP (ref 39–50)
HGB BLD-MCNC: 15.9 G/DL — SIGNIFICANT CHANGE UP (ref 13–17)
MAGNESIUM SERPL-MCNC: 2.2 MG/DL — SIGNIFICANT CHANGE UP (ref 1.6–2.6)
MCHC RBC-ENTMCNC: 29.6 PG — SIGNIFICANT CHANGE UP (ref 27–34)
MCHC RBC-ENTMCNC: 35.3 G/DL — SIGNIFICANT CHANGE UP (ref 32–36)
MCV RBC AUTO: 83.8 FL — SIGNIFICANT CHANGE UP (ref 80–100)
PHOSPHATE SERPL-MCNC: 2.5 MG/DL — SIGNIFICANT CHANGE UP (ref 2.5–4.5)
PLATELET # BLD AUTO: 231 K/UL — SIGNIFICANT CHANGE UP (ref 150–400)
POTASSIUM SERPL-MCNC: 3.8 MMOL/L — SIGNIFICANT CHANGE UP (ref 3.5–5.3)
POTASSIUM SERPL-SCNC: 3.8 MMOL/L — SIGNIFICANT CHANGE UP (ref 3.5–5.3)
RBC # BLD: 5.38 M/UL — SIGNIFICANT CHANGE UP (ref 4.2–5.8)
RBC # FLD: 12.6 % — SIGNIFICANT CHANGE UP (ref 10.3–16.9)
SODIUM SERPL-SCNC: 137 MMOL/L — SIGNIFICANT CHANGE UP (ref 135–145)
WBC # BLD: 11.1 K/UL — HIGH (ref 3.8–10.5)
WBC # FLD AUTO: 11.1 K/UL — HIGH (ref 3.8–10.5)

## 2017-06-27 PROCEDURE — 71010: CPT | Mod: 26,76

## 2017-06-27 RX ORDER — CHLORHEXIDINE GLUCONATE 213 G/1000ML
3 SOLUTION TOPICAL ONCE
Qty: 0 | Refills: 0 | Status: COMPLETED | OUTPATIENT
Start: 2017-06-27 | End: 2017-06-28

## 2017-06-27 RX ORDER — CHLORHEXIDINE GLUCONATE 213 G/1000ML
1 SOLUTION TOPICAL ONCE
Qty: 0 | Refills: 0 | Status: COMPLETED | OUTPATIENT
Start: 2017-06-27 | End: 2017-06-27

## 2017-06-27 RX ORDER — HYDROMORPHONE HYDROCHLORIDE 2 MG/ML
1 INJECTION INTRAMUSCULAR; INTRAVENOUS; SUBCUTANEOUS
Qty: 0 | Refills: 0 | Status: DISCONTINUED | OUTPATIENT
Start: 2017-06-27 | End: 2017-06-28

## 2017-06-27 RX ADMIN — Medication 600 MILLIGRAM(S): at 21:30

## 2017-06-27 RX ADMIN — Medication 600 MILLIGRAM(S): at 02:45

## 2017-06-27 RX ADMIN — HYDROMORPHONE HYDROCHLORIDE 1 MILLIGRAM(S): 2 INJECTION INTRAMUSCULAR; INTRAVENOUS; SUBCUTANEOUS at 21:01

## 2017-06-27 RX ADMIN — LIDOCAINE 1 PATCH: 4 CREAM TOPICAL at 02:01

## 2017-06-27 RX ADMIN — Medication 600 MILLIGRAM(S): at 03:30

## 2017-06-27 RX ADMIN — LAMOTRIGINE 300 MILLIGRAM(S): 25 TABLET, ORALLY DISINTEGRATING ORAL at 11:10

## 2017-06-27 RX ADMIN — Medication 600 MILLIGRAM(S): at 19:32

## 2017-06-27 RX ADMIN — Medication 600 MILLIGRAM(S): at 13:00

## 2017-06-27 RX ADMIN — BUPRENORPHINE AND NALOXONE 1 TABLET(S): 2; .5 TABLET SUBLINGUAL at 07:16

## 2017-06-27 RX ADMIN — PANTOPRAZOLE SODIUM 40 MILLIGRAM(S): 20 TABLET, DELAYED RELEASE ORAL at 07:16

## 2017-06-27 RX ADMIN — HYDROMORPHONE HYDROCHLORIDE 1 MILLIGRAM(S): 2 INJECTION INTRAMUSCULAR; INTRAVENOUS; SUBCUTANEOUS at 22:30

## 2017-06-27 RX ADMIN — SODIUM CHLORIDE 3 MILLILITER(S): 9 INJECTION INTRAMUSCULAR; INTRAVENOUS; SUBCUTANEOUS at 06:00

## 2017-06-27 RX ADMIN — BUPRENORPHINE AND NALOXONE 1 TABLET(S): 2; .5 TABLET SUBLINGUAL at 07:59

## 2017-06-27 RX ADMIN — SPIRONOLACTONE 25 MILLIGRAM(S): 25 TABLET, FILM COATED ORAL at 05:55

## 2017-06-27 RX ADMIN — CHLORHEXIDINE GLUCONATE 1 APPLICATION(S): 213 SOLUTION TOPICAL at 21:24

## 2017-06-27 RX ADMIN — HEPARIN SODIUM 5000 UNIT(S): 5000 INJECTION INTRAVENOUS; SUBCUTANEOUS at 05:53

## 2017-06-27 RX ADMIN — DOLUTEGRAVIR SODIUM 50 MILLIGRAM(S): 25 TABLET, FILM COATED ORAL at 17:32

## 2017-06-27 RX ADMIN — Medication 600 MILLIGRAM(S): at 21:01

## 2017-06-27 RX ADMIN — SODIUM CHLORIDE 3 MILLILITER(S): 9 INJECTION INTRAMUSCULAR; INTRAVENOUS; SUBCUTANEOUS at 21:06

## 2017-06-27 RX ADMIN — LIDOCAINE 1 PATCH: 4 CREAM TOPICAL at 13:33

## 2017-06-27 RX ADMIN — HEPARIN SODIUM 5000 UNIT(S): 5000 INJECTION INTRAVENOUS; SUBCUTANEOUS at 13:01

## 2017-06-27 RX ADMIN — RITONAVIR 100 MILLIGRAM(S): 100 TABLET, FILM COATED ORAL at 11:12

## 2017-06-27 RX ADMIN — HYDROMORPHONE HYDROCHLORIDE 0.5 MILLIGRAM(S): 2 INJECTION INTRAMUSCULAR; INTRAVENOUS; SUBCUTANEOUS at 19:01

## 2017-06-27 RX ADMIN — DARUNAVIR 800 MILLIGRAM(S): 75 TABLET, FILM COATED ORAL at 11:11

## 2017-06-27 RX ADMIN — HEPARIN SODIUM 5000 UNIT(S): 5000 INJECTION INTRAVENOUS; SUBCUTANEOUS at 21:24

## 2017-06-27 RX ADMIN — SODIUM CHLORIDE 3 MILLILITER(S): 9 INJECTION INTRAMUSCULAR; INTRAVENOUS; SUBCUTANEOUS at 13:02

## 2017-06-27 RX ADMIN — ATENOLOL 50 MILLIGRAM(S): 25 TABLET ORAL at 05:55

## 2017-06-27 RX ADMIN — HYDROMORPHONE HYDROCHLORIDE 0.5 MILLIGRAM(S): 2 INJECTION INTRAMUSCULAR; INTRAVENOUS; SUBCUTANEOUS at 02:15

## 2017-06-27 RX ADMIN — Medication 0.5 MILLIGRAM(S): at 11:12

## 2017-06-27 RX ADMIN — HYDROMORPHONE HYDROCHLORIDE 0.5 MILLIGRAM(S): 2 INJECTION INTRAMUSCULAR; INTRAVENOUS; SUBCUTANEOUS at 19:15

## 2017-06-27 RX ADMIN — HYDROMORPHONE HYDROCHLORIDE 0.5 MILLIGRAM(S): 2 INJECTION INTRAMUSCULAR; INTRAVENOUS; SUBCUTANEOUS at 02:00

## 2017-06-27 NOTE — PROGRESS NOTE ADULT - ASSESSMENT
Assessement  57 year old gentleman with spontaneous pneumothorax  Day #1 s/p pigtail placement found to have  lateral upper lobe paraseptal blebs on CT chest done 6/26/17.    Plan:  Neurovascular:   Pain management needs to be improved in the pm  increase dilaudid 1mg q3hrs prn for pain  continue Suboxone and ibuprofen  consider pain management consult post procedure  continue ativan and lamictal    Cardiovascular:   Hemodynamically stable.   -continue atenolol and spirinolactone    Respiratory:   02 Sat = 98% on RA.  -continue chest tube on suction  -plan for surgical intervention wednesday    GI: Stable.  -NPO after MN for procedure  -PPX.  -PO Diet.    Renal / :  -Monitor renal function.  -Monitor I/O's.  -replete lytes    Endocrine:    -blood glucose controlled    Hematologic:  -H/H stable    ID:  -continue ritonavir, darunavir and dolutegravir  -f/u CD4 count results pending    Prophylaxis:  -DVT prophylaxis with 5000 SubQ Heparin q8h.  -SCD's    Disposition:  -OR 6/28/17 for Robotic Assisted  blebectomy/pleurectomy left side

## 2017-06-27 NOTE — PROGRESS NOTE ADULT - SUBJECTIVE AND OBJECTIVE BOX
Patient discussed on morning rounds with Dr. Mcgill    Operation / Date: pigtail 17    SUBJECTIVE ASSESSMENT:  57year old male complained that last night he was in a lot of pain at the site of the pigtail, which subsided eventually with dilauded 2 doses and ibuprofen.     Vital Signs Last 24 Hrs  T(C): 36.8 (2017 17:32), Max: 37.2 (2017 00:50)  T(F): 98.2 (2017 17:32), Max: 98.9 (2017 00:50)  HR: 72 (2017 17:35) (54 - 72)  BP: 142/65 (2017 17:35) (112/65 - 145/64)  BP(mean): 92 (2017 17:35) (82 - 93)  RR: 12 (2017 17:35) (12 - 18)  SpO2: 95% (2017 17:35) (95% - 96%)  I&O's Detail    2017 07:01  -  2017 07:00  --------------------------------------------------------  IN:  Total IN: 0 mL    OUT:    Chest Tube: 5 mL    Voided: 2050 mL  Total OUT: 2055 mL    Total NET: -2055 mL      2017 07:01  -  2017 20:42  --------------------------------------------------------  IN:    Oral Fluid: 300 mL  Total IN: 300 mL    OUT:    Chest Tube: 40 mL    Voided: 900 mL  Total OUT: 940 mL    Total NET: -640 mL    CHEST TUBE:  Yes AIR LEAKS: Yes intermittent on Suction  MADI DRAIN:No.  EPICARDIAL WIRES:No.  TIE DOWNS: No.  KRAUSE:No.    PHYSICAL EXAM:    General: patient seen bedside in NAD    Neurological: A&O x 3    Cardiovascular: RRR, S1S2,no murmur    Respiratory:CTA b/l, no rhonci, no rales, no wheeze    Gastrointestinal: soft, non-tender, +bowel sound    Extremities:no edema    Vascular:all pulses intact    Incision Sites: Left pigtail in place, with clean dry dressing    LABS:                        15.9   11.1  )-----------( 231      ( 2017 07:10 )             45.1       COUMADIN:  Yes/No. REASON: .    PT/INR - ( 2017 18:47 )   PT: 11.3 sec;   INR: 1.02          PTT - ( 2017 18:47 )  PTT:33.4 sec        137  |  98  |  18  ----------------------------<  100<H>  3.8   |  25  |  1.20    Ca    10.0      2017 07:10  Phos  2.5       Mg     2.2         TPro  6.4  /  Alb  3.8  /  TBili  0.5  /  DBili  x   /  AST  21  /  ALT  16  /  AlkPhos  51      Urinalysis Basic - ( 2017 14:09 )    Color: Yellow / Appearance: Clear / S.015 / pH: x  Gluc: x / Ketone: NEGATIVE  / Bili: NEGATIVE / Urobili: 0.2 E.U./dL   Blood: x / Protein: NEGATIVE mg/dL / Nitrite: NEGATIVE   Leuk Esterase: NEGATIVE / RBC: x / WBC x   Sq Epi: x / Non Sq Epi: x / Bacteria: x    MEDICATIONS  (STANDING):  sodium chloride 0.9% lock flush 3 milliLiter(s) IV Push every 8 hours  buprenorphine 2 mG/naloxone 0.5 mG SL  Tablet 1 Tablet(s) SubLingual daily  lidocaine   Patch 1 Patch Transdermal every 24 hours  dolutegravir 50 milliGRAM(s) Oral daily  lamoTRIgine 300 milliGRAM(s) Oral daily  LORazepam     Tablet 0.5 milliGRAM(s) Oral daily  pantoprazole    Tablet 40 milliGRAM(s) Oral before breakfast  darunavir 800 milliGRAM(s) Oral daily  ritonavir Tablet 100 milliGRAM(s) Oral daily  ATENolol  Tablet 50 milliGRAM(s) Oral daily  spironolactone 25 milliGRAM(s) Oral daily  heparin  Injectable 5000 Unit(s) SubCutaneous every 8 hours  chlorhexidine 4% Liquid 1 Application(s) Topical once  chlorhexidine 0.12% Liquid 3 milliLiter(s) Swish and Spit once    MEDICATIONS  (PRN):  ibuprofen  Tablet 600 milliGRAM(s) Oral every 6 hours PRN arthritis  HYDROmorphone  Injectable 1 milliGRAM(s) IV Push every 3 hours PRN Severe Pain (7 - 10)      RADIOLOGY & ADDITIONAL TESTS:    CXR 17 pigtail in place, residual left apical pneumothorax

## 2017-06-27 NOTE — PROGRESS NOTE ADULT - SUBJECTIVE AND OBJECTIVE BOX
Planned Date of Surgery: 17                                                                                                                 Surgeon:Dr. Fortino Mcgill    Procedure:Robotic Assisted Blebectomy/Pleurectomy left side    HPI:  This is a 57 year old gentleman with spontaneous ptx a remote smoker 17 years ago, ETOH/Crack/Marijuana 30years ago with a past medical history pertinent for chronic bronchitis, HLD, HTN, GERD, Mac's Esophagus, Anxiety, Bipolar disorder, narcotics dependent secondary to laminectomy x2 (), R. hip surgery x3 () secondary to staph infection/vascular necrosis, s/p carpel tunnel surgery 2017 who reported he was being treated for bronchitis with a Z-Pac since last 17 and became acutely SOB while climbing stairs today. He presented to [facility] and CXR revealed he had a large left pneumothorax for which a pigtail was placed.  CTchest shows bilateral upper lobe paraseptal blebs on CT chest done 17.    PAST MEDICAL & SURGICAL HISTORY:  Marijuana abuse: 30 years ago  Crack cocaine use: 30 years ago  ETOH abuse: 30 years ago  Suicidal behavior with attempted self-injury:   Arthritis  Hou's esophagus  Bronchitis, chronic  Bipolar disorder  Avascular necrosis of bone of hip, right  HTN (hypertension)  HIV (human immunodeficiency virus infection)  H/O carpal tunnel repair: 2017  History of total hip replacement: x 3  Other postprocedural status: S/P laminectomy    abacavir (Unknown)    MEDICATIONS  (STANDING):  sodium chloride 0.9% lock flush 3 milliLiter(s) IV Push every 8 hours  buprenorphine 2 mG/naloxone 0.5 mG SL  Tablet 1 Tablet(s) SubLingual daily  lidocaine   Patch 1 Patch Transdermal every 24 hours  dolutegravir 50 milliGRAM(s) Oral daily  lamoTRIgine 300 milliGRAM(s) Oral daily  LORazepam     Tablet 0.5 milliGRAM(s) Oral daily  pantoprazole    Tablet 40 milliGRAM(s) Oral before breakfast  darunavir 800 milliGRAM(s) Oral daily  ritonavir Tablet 100 milliGRAM(s) Oral daily  ATENolol  Tablet 50 milliGRAM(s) Oral daily  spironolactone 25 milliGRAM(s) Oral daily  heparin  Injectable 5000 Unit(s) SubCutaneous every 8 hours  chlorhexidine 4% Liquid 1 Application(s) Topical once  chlorhexidine 0.12% Liquid 3 milliLiter(s) Swish and Spit once    MEDICATIONS  (PRN):  ibuprofen  Tablet 600 milliGRAM(s) Oral every 6 hours PRN arthritis  HYDROmorphone  Injectable 1 milliGRAM(s) IV Push every 3 hours PRN Severe Pain (7 - 10)    On Beta Blocker? YES     Labs:                        15.9   11.1  )-----------( 231      ( 2017 07:10 )             45.1         137  |  98  |  18  ----------------------------<  100<H>  3.8   |  25  |  1.20    Ca    10.0      2017 07:10  Phos  2.5       Mg     2.2         TPro  6.4  /  Alb  3.8  /  TBili  0.5  /  DBili  x   /  AST  21  /  ALT  16  /  AlkPhos  51      PT/INR - ( 2017 18:47 )   PT: 11.3 sec;   INR: 1.02          PTT - ( 2017 18:47 )  PTT:33.4 sec  Urinalysis Basic - ( 2017 14:09 )    Color: Yellow / Appearance: Clear / S.015 / pH: x  Gluc: x / Ketone: NEGATIVE  / Bili: NEGATIVE / Urobili: 0.2 E.U./dL   Blood: x / Protein: NEGATIVE mg/dL / Nitrite: NEGATIVE   Leuk Esterase: NEGATIVE / RBC: x / WBC x   Sq Epi: x / Non Sq Epi: x / Bacteria: x    Hemoglobin A1C, Whole Blood: 5.3 % (17 @ 18:47)    CARDIAC MARKERS ( 2017 08:04 )  0.018 ng/mL / x     / x     / x     / x        EKG:sinus bradycardia, ventricular rate 48bpm    CXR:residual left apical ptx, pigtail in place    CT Scans:< from: CT Chest No Cont (17 @ 15:50) >  IMPRESSION:  1. Status post left chest tube insertion. Small residual left   pneumothorax.  2. Bilateral upper lobe paraseptal blebs.  3. Lung nodules. According to current Fleischner Society guidelines    optional  CT recommended at 12 months.      Consult in Chart?  YES   Consent in Chart? YES   Pre-op Orders Placed? YES   Blood Prodeucts Ordered? YES   NPO ordered? YES

## 2017-06-28 ENCOUNTER — RESULT REVIEW (OUTPATIENT)
Age: 57
End: 2017-06-28

## 2017-06-28 ENCOUNTER — APPOINTMENT (OUTPATIENT)
Dept: THORACIC SURGERY | Facility: HOSPITAL | Age: 57
End: 2017-06-28

## 2017-06-28 DIAGNOSIS — I10 ESSENTIAL (PRIMARY) HYPERTENSION: ICD-10-CM

## 2017-06-28 DIAGNOSIS — J93.83 OTHER PNEUMOTHORAX: ICD-10-CM

## 2017-06-28 DIAGNOSIS — Z21 ASYMPTOMATIC HUMAN IMMUNODEFICIENCY VIRUS [HIV] INFECTION STATUS: ICD-10-CM

## 2017-06-28 LAB
4/8 RATIO: 1.18 RATIO — SIGNIFICANT CHANGE UP (ref 0.9–3.6)
ABS CD8: 563 /UL — SIGNIFICANT CHANGE UP (ref 136–757)
ANION GAP SERPL CALC-SCNC: 13 MMOL/L — SIGNIFICANT CHANGE UP (ref 5–17)
ANION GAP SERPL CALC-SCNC: 13 MMOL/L — SIGNIFICANT CHANGE UP (ref 5–17)
APTT BLD: 32.4 SEC — SIGNIFICANT CHANGE UP (ref 27.5–37.4)
BASOPHILS NFR BLD AUTO: 0.1 % — SIGNIFICANT CHANGE UP (ref 0–2)
BUN SERPL-MCNC: 18 MG/DL — SIGNIFICANT CHANGE UP (ref 7–23)
BUN SERPL-MCNC: 21 MG/DL — SIGNIFICANT CHANGE UP (ref 7–23)
CALCIUM SERPL-MCNC: 9.7 MG/DL — SIGNIFICANT CHANGE UP (ref 8.4–10.5)
CALCIUM SERPL-MCNC: 9.8 MG/DL — SIGNIFICANT CHANGE UP (ref 8.4–10.5)
CD16+CD56+ CELLS NFR BLD: 10 % — SIGNIFICANT CHANGE UP (ref 4–25)
CD16+CD56+ CELLS NFR SPEC: 160 /UL — SIGNIFICANT CHANGE UP (ref 64–494)
CD19 BLASTS SPEC-ACNC: 15 % — SIGNIFICANT CHANGE UP (ref 5–22)
CD19 BLASTS SPEC-ACNC: 240 /UL — SIGNIFICANT CHANGE UP (ref 68–528)
CD3 BLASTS SPEC-ACNC: 1202 /UL — SIGNIFICANT CHANGE UP (ref 799–2171)
CD3 BLASTS SPEC-ACNC: 75 % — SIGNIFICANT CHANGE UP (ref 59–85)
CD4 %: 40 % — SIGNIFICANT CHANGE UP (ref 36–65)
CD8 %: 34 % — SIGNIFICANT CHANGE UP (ref 11–36)
CHLORIDE SERPL-SCNC: 100 MMOL/L — SIGNIFICANT CHANGE UP (ref 96–108)
CHLORIDE SERPL-SCNC: 98 MMOL/L — SIGNIFICANT CHANGE UP (ref 96–108)
CO2 SERPL-SCNC: 23 MMOL/L — SIGNIFICANT CHANGE UP (ref 22–31)
CO2 SERPL-SCNC: 26 MMOL/L — SIGNIFICANT CHANGE UP (ref 22–31)
CREAT SERPL-MCNC: 1.1 MG/DL — SIGNIFICANT CHANGE UP (ref 0.5–1.3)
CREAT SERPL-MCNC: 1.2 MG/DL — SIGNIFICANT CHANGE UP (ref 0.5–1.3)
EOSINOPHIL NFR BLD AUTO: 0.8 % — SIGNIFICANT CHANGE UP (ref 0–6)
GLUCOSE SERPL-MCNC: 104 MG/DL — HIGH (ref 70–99)
GLUCOSE SERPL-MCNC: 120 MG/DL — HIGH (ref 70–99)
HCT VFR BLD CALC: 41.9 % — SIGNIFICANT CHANGE UP (ref 39–50)
HCT VFR BLD CALC: 45 % — SIGNIFICANT CHANGE UP (ref 39–50)
HGB BLD-MCNC: 14.6 G/DL — SIGNIFICANT CHANGE UP (ref 13–17)
HGB BLD-MCNC: 15.8 G/DL — SIGNIFICANT CHANGE UP (ref 13–17)
INR BLD: 1.08 — SIGNIFICANT CHANGE UP (ref 0.88–1.16)
LYMPHOCYTES # BLD AUTO: 11.3 % — LOW (ref 13–44)
MAGNESIUM SERPL-MCNC: 2.1 MG/DL — SIGNIFICANT CHANGE UP (ref 1.6–2.6)
MCHC RBC-ENTMCNC: 29 PG — SIGNIFICANT CHANGE UP (ref 27–34)
MCHC RBC-ENTMCNC: 29.3 PG — SIGNIFICANT CHANGE UP (ref 27–34)
MCHC RBC-ENTMCNC: 34.8 G/DL — SIGNIFICANT CHANGE UP (ref 32–36)
MCHC RBC-ENTMCNC: 35.1 G/DL — SIGNIFICANT CHANGE UP (ref 32–36)
MCV RBC AUTO: 83.3 FL — SIGNIFICANT CHANGE UP (ref 80–100)
MCV RBC AUTO: 83.5 FL — SIGNIFICANT CHANGE UP (ref 80–100)
MONOCYTES NFR BLD AUTO: 3.4 % — SIGNIFICANT CHANGE UP (ref 2–14)
NEUTROPHILS NFR BLD AUTO: 84.4 % — HIGH (ref 43–77)
PHOSPHATE SERPL-MCNC: 2.9 MG/DL — SIGNIFICANT CHANGE UP (ref 2.5–4.5)
PLATELET # BLD AUTO: 217 K/UL — SIGNIFICANT CHANGE UP (ref 150–400)
PLATELET # BLD AUTO: 240 K/UL — SIGNIFICANT CHANGE UP (ref 150–400)
POTASSIUM SERPL-MCNC: 4 MMOL/L — SIGNIFICANT CHANGE UP (ref 3.5–5.3)
POTASSIUM SERPL-MCNC: 4.3 MMOL/L — SIGNIFICANT CHANGE UP (ref 3.5–5.3)
POTASSIUM SERPL-SCNC: 4 MMOL/L — SIGNIFICANT CHANGE UP (ref 3.5–5.3)
POTASSIUM SERPL-SCNC: 4.3 MMOL/L — SIGNIFICANT CHANGE UP (ref 3.5–5.3)
PROTHROM AB SERPL-ACNC: 12 SEC — SIGNIFICANT CHANGE UP (ref 9.8–12.7)
RBC # BLD: 5.03 M/UL — SIGNIFICANT CHANGE UP (ref 4.2–5.8)
RBC # BLD: 5.39 M/UL — SIGNIFICANT CHANGE UP (ref 4.2–5.8)
RBC # FLD: 12.2 % — SIGNIFICANT CHANGE UP (ref 10.3–16.9)
RBC # FLD: 12.4 % — SIGNIFICANT CHANGE UP (ref 10.3–16.9)
SODIUM SERPL-SCNC: 134 MMOL/L — LOW (ref 135–145)
SODIUM SERPL-SCNC: 139 MMOL/L — SIGNIFICANT CHANGE UP (ref 135–145)
T-CELL CD4 SUBSET PNL BLD: 662 /UL — SIGNIFICANT CHANGE UP (ref 489–1457)
WBC # BLD: 8.5 K/UL — SIGNIFICANT CHANGE UP (ref 3.8–10.5)
WBC # BLD: 9.6 K/UL — SIGNIFICANT CHANGE UP (ref 3.8–10.5)
WBC # FLD AUTO: 8.5 K/UL — SIGNIFICANT CHANGE UP (ref 3.8–10.5)
WBC # FLD AUTO: 9.6 K/UL — SIGNIFICANT CHANGE UP (ref 3.8–10.5)

## 2017-06-28 PROCEDURE — 93010 ELECTROCARDIOGRAM REPORT: CPT

## 2017-06-28 PROCEDURE — 71010: CPT | Mod: 26,76

## 2017-06-28 RX ORDER — ATENOLOL 25 MG/1
50 TABLET ORAL DAILY
Qty: 0 | Refills: 0 | Status: DISCONTINUED | OUTPATIENT
Start: 2017-06-28 | End: 2017-06-30

## 2017-06-28 RX ORDER — IBUPROFEN 200 MG
600 TABLET ORAL EVERY 6 HOURS
Qty: 0 | Refills: 0 | Status: DISCONTINUED | OUTPATIENT
Start: 2017-06-28 | End: 2017-06-29

## 2017-06-28 RX ORDER — SPIRONOLACTONE 25 MG/1
25 TABLET, FILM COATED ORAL DAILY
Qty: 0 | Refills: 0 | Status: DISCONTINUED | OUTPATIENT
Start: 2017-06-28 | End: 2017-06-30

## 2017-06-28 RX ORDER — HYDROMORPHONE HYDROCHLORIDE 2 MG/ML
1 INJECTION INTRAMUSCULAR; INTRAVENOUS; SUBCUTANEOUS EVERY 4 HOURS
Qty: 0 | Refills: 0 | Status: DISCONTINUED | OUTPATIENT
Start: 2017-06-28 | End: 2017-06-28

## 2017-06-28 RX ORDER — RITONAVIR 100 MG/1
100 TABLET, FILM COATED ORAL DAILY
Qty: 0 | Refills: 0 | Status: DISCONTINUED | OUTPATIENT
Start: 2017-06-28 | End: 2017-06-30

## 2017-06-28 RX ORDER — PANTOPRAZOLE SODIUM 20 MG/1
40 TABLET, DELAYED RELEASE ORAL
Qty: 0 | Refills: 0 | Status: DISCONTINUED | OUTPATIENT
Start: 2017-06-28 | End: 2017-06-30

## 2017-06-28 RX ORDER — KETOROLAC TROMETHAMINE 30 MG/ML
30 SYRINGE (ML) INJECTION ONCE
Qty: 0 | Refills: 0 | Status: DISCONTINUED | OUTPATIENT
Start: 2017-06-28 | End: 2017-06-28

## 2017-06-28 RX ORDER — SODIUM CHLORIDE 9 MG/ML
1000 INJECTION, SOLUTION INTRAVENOUS
Qty: 0 | Refills: 0 | Status: DISCONTINUED | OUTPATIENT
Start: 2017-06-28 | End: 2017-06-30

## 2017-06-28 RX ORDER — DOLUTEGRAVIR SODIUM 25 MG/1
50 TABLET, FILM COATED ORAL DAILY
Qty: 0 | Refills: 0 | Status: DISCONTINUED | OUTPATIENT
Start: 2017-06-28 | End: 2017-06-30

## 2017-06-28 RX ORDER — ATORVASTATIN CALCIUM 80 MG/1
20 TABLET, FILM COATED ORAL AT BEDTIME
Qty: 0 | Refills: 0 | Status: DISCONTINUED | OUTPATIENT
Start: 2017-06-28 | End: 2017-06-30

## 2017-06-28 RX ORDER — LAMOTRIGINE 25 MG/1
300 TABLET, ORALLY DISINTEGRATING ORAL DAILY
Qty: 0 | Refills: 0 | Status: DISCONTINUED | OUTPATIENT
Start: 2017-06-28 | End: 2017-06-30

## 2017-06-28 RX ORDER — ACETAMINOPHEN 500 MG
1000 TABLET ORAL ONCE
Qty: 0 | Refills: 0 | Status: COMPLETED | OUTPATIENT
Start: 2017-06-28 | End: 2017-06-28

## 2017-06-28 RX ORDER — DARUNAVIR 75 MG/1
800 TABLET, FILM COATED ORAL DAILY
Qty: 0 | Refills: 0 | Status: DISCONTINUED | OUTPATIENT
Start: 2017-06-28 | End: 2017-06-30

## 2017-06-28 RX ORDER — HEPARIN SODIUM 5000 [USP'U]/ML
5000 INJECTION INTRAVENOUS; SUBCUTANEOUS EVERY 8 HOURS
Qty: 0 | Refills: 0 | Status: DISCONTINUED | OUTPATIENT
Start: 2017-06-28 | End: 2017-06-30

## 2017-06-28 RX ORDER — BACLOFEN 100 %
10 POWDER (GRAM) MISCELLANEOUS DAILY
Qty: 0 | Refills: 0 | Status: DISCONTINUED | OUTPATIENT
Start: 2017-06-28 | End: 2017-06-30

## 2017-06-28 RX ORDER — CEFAZOLIN SODIUM 1 G
1000 VIAL (EA) INJECTION EVERY 8 HOURS
Qty: 0 | Refills: 0 | Status: COMPLETED | OUTPATIENT
Start: 2017-06-28 | End: 2017-06-29

## 2017-06-28 RX ORDER — LIDOCAINE 4 G/100G
1 CREAM TOPICAL EVERY 24 HOURS
Qty: 0 | Refills: 0 | Status: DISCONTINUED | OUTPATIENT
Start: 2017-06-28 | End: 2017-06-30

## 2017-06-28 RX ORDER — HYDROMORPHONE HYDROCHLORIDE 2 MG/ML
1 INJECTION INTRAMUSCULAR; INTRAVENOUS; SUBCUTANEOUS EVERY 4 HOURS
Qty: 0 | Refills: 0 | Status: DISCONTINUED | OUTPATIENT
Start: 2017-06-28 | End: 2017-06-30

## 2017-06-28 RX ORDER — KETOROLAC TROMETHAMINE 30 MG/ML
15 SYRINGE (ML) INJECTION EVERY 6 HOURS
Qty: 0 | Refills: 0 | Status: DISCONTINUED | OUTPATIENT
Start: 2017-06-28 | End: 2017-06-29

## 2017-06-28 RX ORDER — DOCUSATE SODIUM 100 MG
100 CAPSULE ORAL THREE TIMES A DAY
Qty: 0 | Refills: 0 | Status: DISCONTINUED | OUTPATIENT
Start: 2017-06-28 | End: 2017-06-30

## 2017-06-28 RX ORDER — FAMOTIDINE 10 MG/ML
20 INJECTION INTRAVENOUS DAILY
Qty: 0 | Refills: 0 | Status: DISCONTINUED | OUTPATIENT
Start: 2017-06-28 | End: 2017-06-29

## 2017-06-28 RX ORDER — BUPIVACAINE 13.3 MG/ML
20 INJECTION, SUSPENSION, LIPOSOMAL INFILTRATION ONCE
Qty: 0 | Refills: 0 | Status: DISCONTINUED | OUTPATIENT
Start: 2017-06-28 | End: 2017-06-28

## 2017-06-28 RX ADMIN — CHLORHEXIDINE GLUCONATE 3 MILLILITER(S): 213 SOLUTION TOPICAL at 05:19

## 2017-06-28 RX ADMIN — SODIUM CHLORIDE 3 MILLILITER(S): 9 INJECTION INTRAMUSCULAR; INTRAVENOUS; SUBCUTANEOUS at 05:12

## 2017-06-28 RX ADMIN — HYDROMORPHONE HYDROCHLORIDE 1 MILLIGRAM(S): 2 INJECTION INTRAMUSCULAR; INTRAVENOUS; SUBCUTANEOUS at 10:45

## 2017-06-28 RX ADMIN — RITONAVIR 100 MILLIGRAM(S): 100 TABLET, FILM COATED ORAL at 16:12

## 2017-06-28 RX ADMIN — FAMOTIDINE 20 MILLIGRAM(S): 10 INJECTION INTRAVENOUS at 11:02

## 2017-06-28 RX ADMIN — Medication 0.5 MILLIGRAM(S): at 22:01

## 2017-06-28 RX ADMIN — SPIRONOLACTONE 25 MILLIGRAM(S): 25 TABLET, FILM COATED ORAL at 05:11

## 2017-06-28 RX ADMIN — HYDROMORPHONE HYDROCHLORIDE 1 MILLIGRAM(S): 2 INJECTION INTRAMUSCULAR; INTRAVENOUS; SUBCUTANEOUS at 02:33

## 2017-06-28 RX ADMIN — Medication 1 DROP(S): at 22:01

## 2017-06-28 RX ADMIN — HYDROMORPHONE HYDROCHLORIDE 1 MILLIGRAM(S): 2 INJECTION INTRAMUSCULAR; INTRAVENOUS; SUBCUTANEOUS at 10:59

## 2017-06-28 RX ADMIN — ATORVASTATIN CALCIUM 20 MILLIGRAM(S): 80 TABLET, FILM COATED ORAL at 22:01

## 2017-06-28 RX ADMIN — SODIUM CHLORIDE 50 MILLILITER(S): 9 INJECTION, SOLUTION INTRAVENOUS at 11:03

## 2017-06-28 RX ADMIN — LIDOCAINE 1 PATCH: 4 CREAM TOPICAL at 02:00

## 2017-06-28 RX ADMIN — Medication 600 MILLIGRAM(S): at 05:12

## 2017-06-28 RX ADMIN — PANTOPRAZOLE SODIUM 40 MILLIGRAM(S): 20 TABLET, DELAYED RELEASE ORAL at 06:56

## 2017-06-28 RX ADMIN — Medication 0.5 MILLIGRAM(S): at 06:59

## 2017-06-28 RX ADMIN — DOLUTEGRAVIR SODIUM 50 MILLIGRAM(S): 25 TABLET, FILM COATED ORAL at 16:12

## 2017-06-28 RX ADMIN — Medication 1000 MILLIGRAM(S): at 22:45

## 2017-06-28 RX ADMIN — Medication 30 MILLIGRAM(S): at 11:04

## 2017-06-28 RX ADMIN — Medication 600 MILLIGRAM(S): at 16:00

## 2017-06-28 RX ADMIN — Medication 30 MILLIGRAM(S): at 10:30

## 2017-06-28 RX ADMIN — Medication 400 MILLIGRAM(S): at 22:01

## 2017-06-28 RX ADMIN — HYDROMORPHONE HYDROCHLORIDE 1 MILLIGRAM(S): 2 INJECTION INTRAMUSCULAR; INTRAVENOUS; SUBCUTANEOUS at 02:45

## 2017-06-28 RX ADMIN — HEPARIN SODIUM 5000 UNIT(S): 5000 INJECTION INTRAVENOUS; SUBCUTANEOUS at 22:00

## 2017-06-28 RX ADMIN — HEPARIN SODIUM 5000 UNIT(S): 5000 INJECTION INTRAVENOUS; SUBCUTANEOUS at 05:12

## 2017-06-28 RX ADMIN — Medication 600 MILLIGRAM(S): at 16:30

## 2017-06-28 RX ADMIN — ATENOLOL 50 MILLIGRAM(S): 25 TABLET ORAL at 05:12

## 2017-06-28 RX ADMIN — DARUNAVIR 800 MILLIGRAM(S): 75 TABLET, FILM COATED ORAL at 16:12

## 2017-06-28 RX ADMIN — Medication 100 MILLIGRAM(S): at 16:22

## 2017-06-28 RX ADMIN — Medication 1 DROP(S): at 18:56

## 2017-06-28 RX ADMIN — Medication 100 MILLIGRAM(S): at 22:01

## 2017-06-28 RX ADMIN — Medication 600 MILLIGRAM(S): at 05:40

## 2017-06-28 NOTE — PROGRESS NOTE ADULT - PROBLEM SELECTOR PLAN 1
s/p left robotic assisted blebectomy/pleurectomy/mechanical pleurodesis.    Chest tube in place remains on suction for 24 hours  CXR daily to monitor  pain control with toradol IV, acetaminophen IV and dilaudid IV for break through.  May resume oral pain control

## 2017-06-28 NOTE — PROGRESS NOTE ADULT - ASSESSMENT
This This 57 year old male with PMH significant for smoking history (former), ETOH/Crack/Marijuana 30years ago, HIV (on medications), chronic bronchitis, HLD, HTN, GERD, Mac's Esophagus, Anxiety, Bipolar disorder, narcotics dependent secondary to laminectomy x2 (2001), R. hip surgery x3 (2014) secondary to staph infection/vascular necrosis, s/p carpel tunnel surgery feb/march 2017 presents to CT surgery with findings of left pneumothorax requiring for surgical intervention after non-resolved air leak through a pigtail. This 57 year old male with PMH significant for smoking history (former), ETOH/Crack/Marijuana 30years ago, HIV (on medications), chronic bronchitis, HLD, HTN, GERD, Mac's Esophagus, Anxiety, Bipolar disorder, narcotics dependent secondary to laminectomy x2 (2001), R. hip surgery x3 (2014) secondary to staph infection/vascular necrosis, s/p carpel tunnel surgery feb/march 2017 presents to CT surgery with findings of left pneumothorax requiring for surgical intervention after non-resolved air leak through a pigtail.      Upon arrival, patient's pain is under control with IV regimen.  CXR is performed; there is no pneumothorax and there is no air leakage through the chest tube.

## 2017-06-28 NOTE — PROGRESS NOTE ADULT - SUBJECTIVE AND OBJECTIVE BOX
Acceptance from PACU     Operation / Date: 6/28/17 Left VATS with blebectomy/pleurectomy/mechanical pleurodesis    SUBJECTIVE ASSESSMENT:  57y Male         Vital Signs Last 24 Hrs  T(C): 36.8 (28 Jun 2017 13:30), Max: 36.9 (28 Jun 2017 10:15)  T(F): 98.3 (28 Jun 2017 13:30), Max: 98.5 (28 Jun 2017 10:15)  HR: 67 (28 Jun 2017 13:30) (52 - 72)  BP: 119/69 (28 Jun 2017 13:30) (97/60 - 163/74)  BP(mean): 106 (28 Jun 2017 04:50) (90 - 106)  RR: 17 (28 Jun 2017 13:30) (12 - 18)  SpO2: 99% (28 Jun 2017 13:30) (95% - 100%)  I&O's Detail    27 Jun 2017 07:01  -  28 Jun 2017 07:00  --------------------------------------------------------  IN:    Oral Fluid: 475 mL  Total IN: 475 mL    OUT:    Chest Tube: 44 mL    Voided: 1450 mL  Total OUT: 1494 mL    Total NET: -1019 mL      28 Jun 2017 07:01  -  28 Jun 2017 14:22  --------------------------------------------------------  IN:    lactated ringers.: 150 mL    Oral Fluid: 240 mL  Total IN: 390 mL    OUT:    Chest Tube: 50 mL  Total OUT: 50 mL    Total NET: 340 mL      CHEST TUBE:  Yes  AIR LEAKS: Yes on suction  TIE DOWNS: Yes.  KRAUSE: Yes.    PHYSICAL EXAM:    General:     Neurological:    Cardiovascular:    Respiratory:    Gastrointestinal:    Extremities:    Vascular:    Incision Sites:    LABS:                        14.6   9.6   )-----------( 217      ( 28 Jun 2017 10:58 )             41.9       COUMADIN:  Yes/No. REASON: .    PT/INR - ( 28 Jun 2017 10:58 )   PT: 12.0 sec;   INR: 1.08          PTT - ( 28 Jun 2017 10:58 )  PTT:32.4 sec    06-28    134<L>  |  98  |  21  ----------------------------<  120<H>  4.3   |  23  |  1.20    Ca    9.7      28 Jun 2017 10:58  Phos  2.9     06-28  Mg     2.1     06-28    TPro  6.4  /  Alb  3.8  /  TBili  0.5  /  DBili  x   /  AST  21  /  ALT  16  /  AlkPhos  51  06-26      MEDICATIONS  (STANDING):  lactated ringers. 1000 milliLiter(s) (50 mL/Hr) IV Continuous <Continuous>  ceFAZolin   IVPB 1000 milliGRAM(s) IV Intermittent every 8 hours  famotidine Injectable 20 milliGRAM(s) IV Push daily  docusate sodium 100 milliGRAM(s) Oral three times a day  acetaminophen  IVPB. 1000 milliGRAM(s) IV Intermittent once  dolutegravir 50 milliGRAM(s) Oral daily  darunavir 800 milliGRAM(s) Oral daily  ritonavir Tablet 100 milliGRAM(s) Oral daily  heparin  Injectable 5000 Unit(s) SubCutaneous every 8 hours    MEDICATIONS  (PRN):  ketorolac   Injectable 15 milliGRAM(s) IV Push every 6 hours PRN Moderate Pain        RADIOLOGY & ADDITIONAL TESTS: Acceptance from PACU     Operation / Date: 6/28/17 Left VATS with blebectomy/pleurectomy/mechanical pleurodesis    SUBJECTIVE ASSESSMENT:  57y Male         Vital Signs Last 24 Hrs  T(C): 36.8 (28 Jun 2017 13:30), Max: 36.9 (28 Jun 2017 10:15)  T(F): 98.3 (28 Jun 2017 13:30), Max: 98.5 (28 Jun 2017 10:15)  HR: 67 (28 Jun 2017 13:30) (52 - 72)  BP: 119/69 (28 Jun 2017 13:30) (97/60 - 163/74)  BP(mean): 106 (28 Jun 2017 04:50) (90 - 106)  RR: 17 (28 Jun 2017 13:30) (12 - 18)  SpO2: 99% (28 Jun 2017 13:30) (95% - 100%)  I&O's Detail    27 Jun 2017 07:01  -  28 Jun 2017 07:00  --------------------------------------------------------  IN:    Oral Fluid: 475 mL  Total IN: 475 mL    OUT:    Chest Tube: 44 mL    Voided: 1450 mL  Total OUT: 1494 mL    Total NET: -1019 mL      28 Jun 2017 07:01  -  28 Jun 2017 14:22  --------------------------------------------------------  IN:    lactated ringers.: 150 mL    Oral Fluid: 240 mL  Total IN: 390 mL    OUT:    Chest Tube: 50 mL  Total OUT: 50 mL    Total NET: 340 mL      CHEST TUBE:  Yes  AIR LEAKS: Yes on suction  TIE DOWNS: Yes.  KRAUSE: Yes.    PHYSICAL EXAM:    General: NAD  WDWN    Neurological: grossly intact    Cardiovascular: RRR without murmur    Respiratory: at nasal cannula.  No wheezing and no rhonchi    Gastrointestinal: resume oral intake. BS and BM are intact     Extremities: no edema    Vascular: pulses are intact    Incision Sites: intact.      LABS:                        14.6   9.6   )-----------( 217      ( 28 Jun 2017 10:58 )             41.9       PT/INR - ( 28 Jun 2017 10:58 )   PT: 12.0 sec;   INR: 1.08     PTT - ( 28 Jun 2017 10:58 )  PTT:32.4 sec    06-28    134<L>  |  98  |  21  ----------------------------<  120<H>  4.3   |  23  |  1.20    Ca    9.7      28 Jun 2017 10:58  Phos  2.9     06-28  Mg     2.1     06-28    TPro  6.4  /  Alb  3.8  /  TBili  0.5  /  DBili  x   /  AST  21  /  ALT  16  /  AlkPhos  51  06-26      MEDICATIONS  (STANDING):  lactated ringers. 1000 milliLiter(s) (50 mL/Hr) IV Continuous <Continuous>  ceFAZolin   IVPB 1000 milliGRAM(s) IV Intermittent every 8 hours  famotidine Injectable 20 milliGRAM(s) IV Push daily  docusate sodium 100 milliGRAM(s) Oral three times a day  acetaminophen  IVPB. 1000 milliGRAM(s) IV Intermittent once  dolutegravir 50 milliGRAM(s) Oral daily  darunavir 800 milliGRAM(s) Oral daily  ritonavir Tablet 100 milliGRAM(s) Oral daily  heparin  Injectable 5000 Unit(s) SubCutaneous every 8 hours    MEDICATIONS  (PRN):  ketorolac   Injectable 15 milliGRAM(s) IV Push every 6 hours PRN Moderate Pain        RADIOLOGY & ADDITIONAL TESTS:  CXR  Small left apex pneumothorax, grossly similar to prior exam. Acceptance from PACU     Operation / Date: 6/28/17 Left VATS with blebectomy/pleurectomy/mechanical pleurodesis    SUBJECTIVE ASSESSMENT:  57y Male with PMH significant for HIV positibe (on medications), etoh/substance abuse, HTN, HLD, GERD, abilio's esophagus, anxiety/bipolar disorders, narcotics dependent secondary to laminectomy x2 (2001), R. hip surgery x3 (2014) secondary to staph infection/vascular necrosis, s/p carpel tunnel surgery feb/march 2017 underwent left robotic assisted blebectomy/pleurectomy/mechanical pleurodesis and is transferred from PACU after his recover from the anesthesia.      Upon arrival, patient's pain is under control with IV regimen.  CXR is performed; there is no pneumothorax and there is no air leakage through the chest tube.        Vital Signs Last 24 Hrs  T(C): 36.8 (28 Jun 2017 13:30), Max: 36.9 (28 Jun 2017 10:15)  T(F): 98.3 (28 Jun 2017 13:30), Max: 98.5 (28 Jun 2017 10:15)  HR: 67 (28 Jun 2017 13:30) (52 - 72)  BP: 119/69 (28 Jun 2017 13:30) (97/60 - 163/74)  BP(mean): 106 (28 Jun 2017 04:50) (90 - 106)  RR: 17 (28 Jun 2017 13:30) (12 - 18)  SpO2: 99% (28 Jun 2017 13:30) (95% - 100%)  I&O's Detail    27 Jun 2017 07:01  -  28 Jun 2017 07:00  --------------------------------------------------------  IN:    Oral Fluid: 475 mL  Total IN: 475 mL    OUT:    Chest Tube: 44 mL    Voided: 1450 mL  Total OUT: 1494 mL    Total NET: -1019 mL      28 Jun 2017 07:01  -  28 Jun 2017 14:22  --------------------------------------------------------  IN:    lactated ringers.: 150 mL    Oral Fluid: 240 mL  Total IN: 390 mL    OUT:    Chest Tube: 50 mL  Total OUT: 50 mL    Total NET: 340 mL      CHEST TUBE:  Yes  AIR LEAKS: Yes on suction  TIE DOWNS: Yes.  KRAUSE: Yes.    PHYSICAL EXAM:    General: NAD  WDWN    Neurological: grossly intact    Cardiovascular: RRR without murmur    Respiratory: at nasal cannula.  No wheezing and no rhonchi    Gastrointestinal: resume oral intake. BS and BM are intact     Extremities: no edema    Vascular: pulses are intact    Incision Sites: intact.      LABS:                        14.6   9.6   )-----------( 217      ( 28 Jun 2017 10:58 )             41.9       PT/INR - ( 28 Jun 2017 10:58 )   PT: 12.0 sec;   INR: 1.08     PTT - ( 28 Jun 2017 10:58 )  PTT:32.4 sec    06-28    134<L>  |  98  |  21  ----------------------------<  120<H>  4.3   |  23  |  1.20    Ca    9.7      28 Jun 2017 10:58  Phos  2.9     06-28  Mg     2.1     06-28    TPro  6.4  /  Alb  3.8  /  TBili  0.5  /  DBili  x   /  AST  21  /  ALT  16  /  AlkPhos  51  06-26      MEDICATIONS  (STANDING):  lactated ringers. 1000 milliLiter(s) (50 mL/Hr) IV Continuous <Continuous>  ceFAZolin   IVPB 1000 milliGRAM(s) IV Intermittent every 8 hours  famotidine Injectable 20 milliGRAM(s) IV Push daily  docusate sodium 100 milliGRAM(s) Oral three times a day  acetaminophen  IVPB. 1000 milliGRAM(s) IV Intermittent once  dolutegravir 50 milliGRAM(s) Oral daily  darunavir 800 milliGRAM(s) Oral daily  ritonavir Tablet 100 milliGRAM(s) Oral daily  heparin  Injectable 5000 Unit(s) SubCutaneous every 8 hours    MEDICATIONS  (PRN):  ketorolac   Injectable 15 milliGRAM(s) IV Push every 6 hours PRN Moderate Pain        RADIOLOGY & ADDITIONAL TESTS:  CXR  Small left apex pneumothorax, grossly similar to prior exam.

## 2017-06-28 NOTE — BRIEF OPERATIVE NOTE - OPERATION/FINDINGS
Procedures: left robotic assisted left wedge resection/blebectomy, pleurectomy and mechanical pleurodesis.    !x chest tube placement

## 2017-06-29 LAB
ANION GAP SERPL CALC-SCNC: 15 MMOL/L — SIGNIFICANT CHANGE UP (ref 5–17)
BASOPHILS NFR BLD AUTO: 0 % — SIGNIFICANT CHANGE UP (ref 0–2)
BUN SERPL-MCNC: 17 MG/DL — SIGNIFICANT CHANGE UP (ref 7–23)
CALCIUM SERPL-MCNC: 9.7 MG/DL — SIGNIFICANT CHANGE UP (ref 8.4–10.5)
CHLORIDE SERPL-SCNC: 98 MMOL/L — SIGNIFICANT CHANGE UP (ref 96–108)
CO2 SERPL-SCNC: 23 MMOL/L — SIGNIFICANT CHANGE UP (ref 22–31)
CREAT SERPL-MCNC: 1.1 MG/DL — SIGNIFICANT CHANGE UP (ref 0.5–1.3)
EOSINOPHIL NFR BLD AUTO: 0 % — SIGNIFICANT CHANGE UP (ref 0–6)
GLUCOSE SERPL-MCNC: 123 MG/DL — HIGH (ref 70–99)
GRAM STN FLD: SIGNIFICANT CHANGE UP
HCT VFR BLD CALC: 41.8 % — SIGNIFICANT CHANGE UP (ref 39–50)
HGB BLD-MCNC: 15.2 G/DL — SIGNIFICANT CHANGE UP (ref 13–17)
LYMPHOCYTES # BLD AUTO: 7.3 % — LOW (ref 13–44)
MCHC RBC-ENTMCNC: 30.2 PG — SIGNIFICANT CHANGE UP (ref 27–34)
MCHC RBC-ENTMCNC: 36.4 G/DL — HIGH (ref 32–36)
MCV RBC AUTO: 82.9 FL — SIGNIFICANT CHANGE UP (ref 80–100)
MONOCYTES NFR BLD AUTO: 5.1 % — SIGNIFICANT CHANGE UP (ref 2–14)
NEUTROPHILS NFR BLD AUTO: 87.6 % — HIGH (ref 43–77)
NIGHT BLUE STAIN TISS: SIGNIFICANT CHANGE UP
PLATELET # BLD AUTO: 275 K/UL — SIGNIFICANT CHANGE UP (ref 150–400)
POTASSIUM SERPL-MCNC: 4 MMOL/L — SIGNIFICANT CHANGE UP (ref 3.5–5.3)
POTASSIUM SERPL-SCNC: 4 MMOL/L — SIGNIFICANT CHANGE UP (ref 3.5–5.3)
RBC # BLD: 5.04 M/UL — SIGNIFICANT CHANGE UP (ref 4.2–5.8)
RBC # FLD: 12.4 % — SIGNIFICANT CHANGE UP (ref 10.3–16.9)
SODIUM SERPL-SCNC: 136 MMOL/L — SIGNIFICANT CHANGE UP (ref 135–145)
SPECIMEN SOURCE: SIGNIFICANT CHANGE UP
SPECIMEN SOURCE: SIGNIFICANT CHANGE UP
WBC # BLD: 20.8 K/UL — HIGH (ref 3.8–10.5)
WBC # FLD AUTO: 20.8 K/UL — HIGH (ref 3.8–10.5)

## 2017-06-29 PROCEDURE — 71010: CPT | Mod: 26

## 2017-06-29 RX ORDER — SODIUM CHLORIDE 0.65 %
1 AEROSOL, SPRAY (ML) NASAL
Qty: 0 | Refills: 0 | Status: DISCONTINUED | OUTPATIENT
Start: 2017-06-29 | End: 2017-06-30

## 2017-06-29 RX ADMIN — DARUNAVIR 800 MILLIGRAM(S): 75 TABLET, FILM COATED ORAL at 11:41

## 2017-06-29 RX ADMIN — LIDOCAINE 1 PATCH: 4 CREAM TOPICAL at 09:53

## 2017-06-29 RX ADMIN — SPIRONOLACTONE 25 MILLIGRAM(S): 25 TABLET, FILM COATED ORAL at 05:52

## 2017-06-29 RX ADMIN — HYDROMORPHONE HYDROCHLORIDE 1 MILLIGRAM(S): 2 INJECTION INTRAMUSCULAR; INTRAVENOUS; SUBCUTANEOUS at 13:11

## 2017-06-29 RX ADMIN — ATORVASTATIN CALCIUM 20 MILLIGRAM(S): 80 TABLET, FILM COATED ORAL at 21:06

## 2017-06-29 RX ADMIN — LAMOTRIGINE 300 MILLIGRAM(S): 25 TABLET, ORALLY DISINTEGRATING ORAL at 11:41

## 2017-06-29 RX ADMIN — FAMOTIDINE 20 MILLIGRAM(S): 10 INJECTION INTRAVENOUS at 11:40

## 2017-06-29 RX ADMIN — Medication 100 MILLIGRAM(S): at 13:11

## 2017-06-29 RX ADMIN — HYDROMORPHONE HYDROCHLORIDE 1 MILLIGRAM(S): 2 INJECTION INTRAMUSCULAR; INTRAVENOUS; SUBCUTANEOUS at 02:59

## 2017-06-29 RX ADMIN — Medication 1 SPRAY(S): at 18:05

## 2017-06-29 RX ADMIN — Medication 600 MILLIGRAM(S): at 05:52

## 2017-06-29 RX ADMIN — Medication 600 MILLIGRAM(S): at 11:47

## 2017-06-29 RX ADMIN — Medication 100 MILLIGRAM(S): at 05:52

## 2017-06-29 RX ADMIN — LIDOCAINE 1 PATCH: 4 CREAM TOPICAL at 21:35

## 2017-06-29 RX ADMIN — HEPARIN SODIUM 5000 UNIT(S): 5000 INJECTION INTRAVENOUS; SUBCUTANEOUS at 21:06

## 2017-06-29 RX ADMIN — Medication 100 MILLIGRAM(S): at 01:01

## 2017-06-29 RX ADMIN — Medication 600 MILLIGRAM(S): at 06:58

## 2017-06-29 RX ADMIN — Medication 100 MILLIGRAM(S): at 11:40

## 2017-06-29 RX ADMIN — HYDROMORPHONE HYDROCHLORIDE 1 MILLIGRAM(S): 2 INJECTION INTRAMUSCULAR; INTRAVENOUS; SUBCUTANEOUS at 03:15

## 2017-06-29 RX ADMIN — HEPARIN SODIUM 5000 UNIT(S): 5000 INJECTION INTRAVENOUS; SUBCUTANEOUS at 05:52

## 2017-06-29 RX ADMIN — HEPARIN SODIUM 5000 UNIT(S): 5000 INJECTION INTRAVENOUS; SUBCUTANEOUS at 13:11

## 2017-06-29 RX ADMIN — DOLUTEGRAVIR SODIUM 50 MILLIGRAM(S): 25 TABLET, FILM COATED ORAL at 11:42

## 2017-06-29 RX ADMIN — ATENOLOL 50 MILLIGRAM(S): 25 TABLET ORAL at 05:52

## 2017-06-29 RX ADMIN — HYDROMORPHONE HYDROCHLORIDE 1 MILLIGRAM(S): 2 INJECTION INTRAMUSCULAR; INTRAVENOUS; SUBCUTANEOUS at 22:15

## 2017-06-29 RX ADMIN — HYDROMORPHONE HYDROCHLORIDE 1 MILLIGRAM(S): 2 INJECTION INTRAMUSCULAR; INTRAVENOUS; SUBCUTANEOUS at 19:12

## 2017-06-29 RX ADMIN — RITONAVIR 100 MILLIGRAM(S): 100 TABLET, FILM COATED ORAL at 16:02

## 2017-06-29 RX ADMIN — HYDROMORPHONE HYDROCHLORIDE 1 MILLIGRAM(S): 2 INJECTION INTRAMUSCULAR; INTRAVENOUS; SUBCUTANEOUS at 21:54

## 2017-06-29 RX ADMIN — HYDROMORPHONE HYDROCHLORIDE 1 MILLIGRAM(S): 2 INJECTION INTRAMUSCULAR; INTRAVENOUS; SUBCUTANEOUS at 18:23

## 2017-06-29 RX ADMIN — Medication 10 MILLIGRAM(S): at 11:41

## 2017-06-29 RX ADMIN — Medication 100 MILLIGRAM(S): at 21:06

## 2017-06-29 RX ADMIN — Medication 600 MILLIGRAM(S): at 13:11

## 2017-06-29 RX ADMIN — Medication 0.5 MILLIGRAM(S): at 21:06

## 2017-06-29 RX ADMIN — PANTOPRAZOLE SODIUM 40 MILLIGRAM(S): 20 TABLET, DELAYED RELEASE ORAL at 06:58

## 2017-06-29 RX ADMIN — Medication 1 DROP(S): at 07:00

## 2017-06-29 RX ADMIN — HYDROMORPHONE HYDROCHLORIDE 1 MILLIGRAM(S): 2 INJECTION INTRAMUSCULAR; INTRAVENOUS; SUBCUTANEOUS at 14:40

## 2017-06-29 NOTE — PROGRESS NOTE ADULT - ASSESSMENT
This 57 year old male with PMH significant for smoking history (former), ETOH/Crack/Marijuana 30years ago, HIV (on medications), chronic bronchitis, HLD, HTN, GERD, Mac's Esophagus, Anxiety, Bipolar disorder, narcotics dependent secondary to laminectomy x2 (2001), R. hip surgery x3 (2014) secondary to staph infection/vascular necrosis, s/p carpel tunnel surgery feb/march 2017 presents to CT surgery with findings of left pneumothorax requiring for surgical intervention after non-resolved air leak through a pigtail. Chest tube to be removed tomorrow in AM; there is no pneumothorax and there is no air leakage through the chest tube.  - pain management with IV tylenol, Lidicaine patch and Percocet.   - HIV medication re-started.   - Lorazepam for Anxiety   - Continue Atorvastatin 20mg PO for HLD.   - Atenolol 50mg PO  and spirolactone for HTN     Yoana Coughlin PA-c

## 2017-06-29 NOTE — DIETITIAN INITIAL EVALUATION ADULT. - OTHER INFO
58y/o M s/p L robotic assisted blebectomy and pleurectomy/mechanical pleurodesis. Pt seen in good spirits OOB. He reports a great appetite and intake; consuming 100% of meals/snacks. Denies N/V/D/C, pain, and mechanical issues with po intake. Last BM today. Pt with a h/o HIV + and prior ETOH/substance abuse. He used to weigh 270# 3 years ago and lost ~72# 2/2 hospitalizations and making big diet/lifestyle modifications. Current # has been his stable wt for the past 2 years. Pt follows a very balanced, whole food diet consisting of lean protein, whole grains, and fruits/vegetables. He avoids most sugar and does not do well with processed foods. Encouraged continuation for current diet and discussed increased needs. Will follow.

## 2017-06-29 NOTE — PROGRESS NOTE ADULT - SUBJECTIVE AND OBJECTIVE BOX
Patient discussed on morning rounds with Dr. Mcgill     Operation / Date: 6/28/17 left robotic assisted blebectomy and pleurectomy/mechanical pleurodesis.      SUBJECTIVE ASSESSMENT:  57y Male reports feeling anxious this morning. Reports pain at rhe        Vital Signs Last 24 Hrs  T(C): 37 (29 Jun 2017 17:08), Max: 37.1 (28 Jun 2017 20:39)  T(F): 98.6 (29 Jun 2017 17:08), Max: 98.8 (28 Jun 2017 20:39)  HR: 74 (29 Jun 2017 12:30) (62 - 77)  BP: 149/85 (29 Jun 2017 12:30) (123/59 - 150/77)  BP(mean): 106 (29 Jun 2017 09:00) (83 - 106)  RR: 16 (29 Jun 2017 12:30) (14 - 16)  SpO2: 98% (29 Jun 2017 12:30) (94% - 98%)  I&O's Detail    28 Jun 2017 07:01  -  29 Jun 2017 07:00  --------------------------------------------------------  IN:    IV PiggyBack: 100 mL    lactated ringers.: 150 mL    Oral Fluid: 630 mL  Total IN: 880 mL    OUT:    Chest Tube: 90 mL    Voided: 850 mL  Total OUT: 940 mL    Total NET: -60 mL      29 Jun 2017 07:01  -  29 Jun 2017 17:32  --------------------------------------------------------  IN:    IV PiggyBack: 50 mL  Total IN: 50 mL    OUT:    Chest Tube: 4 mL    Voided: 750 mL  Total OUT: 754 mL    Total NET: -704 mL          CHEST TUBE:  Yes/No. AIR LEAKS: Yes/No. Suction / H2O SEAL.   MADI DRAIN:  Yes/No.  EPICARDIAL WIRES: Yes/No.  TIE DOWNS: Yes/No.  KRAUSE: Yes/No.    PHYSICAL EXAM:    General:     Neurological:    Cardiovascular:    Respiratory:    Gastrointestinal:    Extremities:    Vascular:    Incision Sites:    LABS:                        15.2   20.8  )-----------( 275      ( 29 Jun 2017 06:35 )             41.8       COUMADIN:  Yes/No. REASON: .    PT/INR - ( 28 Jun 2017 10:58 )   PT: 12.0 sec;   INR: 1.08          PTT - ( 28 Jun 2017 10:58 )  PTT:32.4 sec    06-29    136  |  98  |  17  ----------------------------<  123<H>  4.0   |  23  |  1.10    Ca    9.7      29 Jun 2017 06:35  Phos  2.9     06-28  Mg     2.1     06-28            MEDICATIONS  (STANDING):  lactated ringers. 1000 milliLiter(s) (50 mL/Hr) IV Continuous <Continuous>  famotidine Injectable 20 milliGRAM(s) IV Push daily  docusate sodium 100 milliGRAM(s) Oral three times a day  dolutegravir 50 milliGRAM(s) Oral daily  darunavir 800 milliGRAM(s) Oral daily  ritonavir Tablet 100 milliGRAM(s) Oral daily  heparin  Injectable 5000 Unit(s) SubCutaneous every 8 hours  pantoprazole    Tablet 40 milliGRAM(s) Oral before breakfast  LORazepam     Tablet 0.5 milliGRAM(s) Oral at bedtime  atorvastatin 20 milliGRAM(s) Oral at bedtime  ATENolol  Tablet 50 milliGRAM(s) Oral daily  lamoTRIgine 300 milliGRAM(s) Oral daily  baclofen 10 milliGRAM(s) Oral daily  lidocaine   Patch 1 Patch Transdermal every 24 hours  spironolactone 25 milliGRAM(s) Oral daily  artificial  tears Solution 1 Drop(s) Right EYE every 4 hours    MEDICATIONS  (PRN):  ketorolac   Injectable 15 milliGRAM(s) IV Push every 6 hours PRN Moderate Pain  ibuprofen  Tablet 600 milliGRAM(s) Oral every 6 hours PRN arthritis  oxyCODONE  5 mG/acetaminophen 325 mG 1 Tablet(s) Oral every 6 hours PRN Moderate Pain (4 - 6)  HYDROmorphone  Injectable 1 milliGRAM(s) IV Push every 4 hours PRN Severe Pain (7 - 10)        RADIOLOGY & ADDITIONAL TESTS: Patient discussed on morning rounds with Dr. Mcgill     Operation / Date: 6/28/17 left robotic assisted blebectomy and pleurectomy/mechanical pleurodesis.      SUBJECTIVE ASSESSMENT:  57y Male reports feeling anxious this morning. Reports pain at rhe        Vital Signs Last 24 Hrs  T(C): 37 (29 Jun 2017 17:08), Max: 37.1 (28 Jun 2017 20:39)  T(F): 98.6 (29 Jun 2017 17:08), Max: 98.8 (28 Jun 2017 20:39)  HR: 74 (29 Jun 2017 12:30) (62 - 77)  BP: 149/85 (29 Jun 2017 12:30) (123/59 - 150/77)  BP(mean): 106 (29 Jun 2017 09:00) (83 - 106)  RR: 16 (29 Jun 2017 12:30) (14 - 16)  SpO2: 98% (29 Jun 2017 12:30) (94% - 98%)  I&O's Detail    28 Jun 2017 07:01  -  29 Jun 2017 07:00  --------------------------------------------------------  IN:    IV PiggyBack: 100 mL    lactated ringers.: 150 mL    Oral Fluid: 630 mL  Total IN: 880 mL    OUT:    Chest Tube: 90 mL    Voided: 850 mL  Total OUT: 940 mL    Total NET: -60 mL      29 Jun 2017 07:01  -  29 Jun 2017 17:32  --------------------------------------------------------  IN:    IV PiggyBack: 50 mL  Total IN: 50 mL    OUT:    Chest Tube: 4 mL    Voided: 750 mL  Total OUT: 754 mL    Total NET: -704 mL          CHEST TUBE:  No. AIR LEAKS: Yes/No. Suction / H2O SEAL.   MADI DRAIN:  Yes/No.  EPICARDIAL WIRES: Yes/No.  TIE DOWNS: Yes/No.  KRAUSE: Yes/No.    PHYSICAL EXAM:    General:     Neurological:    Cardiovascular:    Respiratory:    Gastrointestinal:    Extremities:    Vascular:    Incision Sites:    LABS:                        15.2   20.8  )-----------( 275      ( 29 Jun 2017 06:35 )             41.8       COUMADIN:  Yes/No. REASON: .    PT/INR - ( 28 Jun 2017 10:58 )   PT: 12.0 sec;   INR: 1.08          PTT - ( 28 Jun 2017 10:58 )  PTT:32.4 sec    06-29    136  |  98  |  17  ----------------------------<  123<H>  4.0   |  23  |  1.10    Ca    9.7      29 Jun 2017 06:35  Phos  2.9     06-28  Mg     2.1     06-28            MEDICATIONS  (STANDING):  lactated ringers. 1000 milliLiter(s) (50 mL/Hr) IV Continuous <Continuous>  famotidine Injectable 20 milliGRAM(s) IV Push daily  docusate sodium 100 milliGRAM(s) Oral three times a day  dolutegravir 50 milliGRAM(s) Oral daily  darunavir 800 milliGRAM(s) Oral daily  ritonavir Tablet 100 milliGRAM(s) Oral daily  heparin  Injectable 5000 Unit(s) SubCutaneous every 8 hours  pantoprazole    Tablet 40 milliGRAM(s) Oral before breakfast  LORazepam     Tablet 0.5 milliGRAM(s) Oral at bedtime  atorvastatin 20 milliGRAM(s) Oral at bedtime  ATENolol  Tablet 50 milliGRAM(s) Oral daily  lamoTRIgine 300 milliGRAM(s) Oral daily  baclofen 10 milliGRAM(s) Oral daily  lidocaine   Patch 1 Patch Transdermal every 24 hours  spironolactone 25 milliGRAM(s) Oral daily  artificial  tears Solution 1 Drop(s) Right EYE every 4 hours    MEDICATIONS  (PRN):  ketorolac   Injectable 15 milliGRAM(s) IV Push every 6 hours PRN Moderate Pain  ibuprofen  Tablet 600 milliGRAM(s) Oral every 6 hours PRN arthritis  oxyCODONE  5 mG/acetaminophen 325 mG 1 Tablet(s) Oral every 6 hours PRN Moderate Pain (4 - 6)  HYDROmorphone  Injectable 1 milliGRAM(s) IV Push every 4 hours PRN Severe Pain (7 - 10)        RADIOLOGY & ADDITIONAL TESTS: Patient discussed on morning rounds with Dr. Mcgill     Operation / Date: 6/28/17 left robotic assisted blebectomy and pleurectomy/mechanical pleurodesis.      SUBJECTIVE ASSESSMENT:  57y Male reports feeling anxious this morning but better in the afternoon.. Reports pain at the chest tube site but reports that due to history of drug abuse he is trying to not take narcotics. No other complaints at this time.         Vital Signs Last 24 Hrs  T(C): 37 (29 Jun 2017 17:08), Max: 37.1 (28 Jun 2017 20:39)  T(F): 98.6 (29 Jun 2017 17:08), Max: 98.8 (28 Jun 2017 20:39)  HR: 74 (29 Jun 2017 12:30) (62 - 77)  BP: 149/85 (29 Jun 2017 12:30) (123/59 - 150/77)  BP(mean): 106 (29 Jun 2017 09:00) (83 - 106)  RR: 16 (29 Jun 2017 12:30) (14 - 16)  SpO2: 98% (29 Jun 2017 12:30) (94% - 98%)  I&O's Detail    28 Jun 2017 07:01  -  29 Jun 2017 07:00  --------------------------------------------------------  IN:    IV PiggyBack: 100 mL    lactated ringers.: 150 mL    Oral Fluid: 630 mL  Total IN: 880 mL    OUT:    Chest Tube: 90 mL    Voided: 850 mL  Total OUT: 940 mL    Total NET: -60 mL      29 Jun 2017 07:01  -  29 Jun 2017 17:32  --------------------------------------------------------  IN:    IV PiggyBack: 50 mL  Total IN: 50 mL    OUT:    Chest Tube: 4 mL    Voided: 750 mL  Total OUT: 754 mL    Total NET: -704 mL          CHEST TUBE:  yes. AIR LEAKS: No. Suction.   MADI DRAIN:  no.  EPICARDIAL WIRES/No.  TIE DOWNS: No.  KRAUSE: /No.    PHYSICAL EXAM:    General: AOx3 in no acute distress.     Neurological: No focal nuero deficit.    Cardiovascular: RRR no mumurs rubs or gallops     Respiratory: right lung with scattered rhonchi mild crepitus at chest tube insertion site. Left lung clear to ausculation.     Gastrointestinal: soft non tender non distended. normal bowel sounds    Extremities: WWP no peripheral edema.     Vascular: 2+ distal pulses        LABS:                        15.2   20.8  )-----------( 275      ( 29 Jun 2017 06:35 )             41.8           PT/INR - ( 28 Jun 2017 10:58 )   PT: 12.0 sec;   INR: 1.08          PTT - ( 28 Jun 2017 10:58 )  PTT:32.4 sec    06-29    136  |  98  |  17  ----------------------------<  123<H>  4.0   |  23  |  1.10    Ca    9.7      29 Jun 2017 06:35  Phos  2.9     06-28  Mg     2.1     06-28            MEDICATIONS  (STANDING):  lactated ringers. 1000 milliLiter(s) (50 mL/Hr) IV Continuous <Continuous>  famotidine Injectable 20 milliGRAM(s) IV Push daily  docusate sodium 100 milliGRAM(s) Oral three times a day  dolutegravir 50 milliGRAM(s) Oral daily  darunavir 800 milliGRAM(s) Oral daily  ritonavir Tablet 100 milliGRAM(s) Oral daily  heparin  Injectable 5000 Unit(s) SubCutaneous every 8 hours  pantoprazole    Tablet 40 milliGRAM(s) Oral before breakfast  LORazepam     Tablet 0.5 milliGRAM(s) Oral at bedtime  atorvastatin 20 milliGRAM(s) Oral at bedtime  ATENolol  Tablet 50 milliGRAM(s) Oral daily  lamoTRIgine 300 milliGRAM(s) Oral daily  baclofen 10 milliGRAM(s) Oral daily  lidocaine   Patch 1 Patch Transdermal every 24 hours  spironolactone 25 milliGRAM(s) Oral daily  artificial  tears Solution 1 Drop(s) Right EYE every 4 hours    MEDICATIONS  (PRN):  ketorolac   Injectable 15 milliGRAM(s) IV Push every 6 hours PRN Moderate Pain  ibuprofen  Tablet 600 milliGRAM(s) Oral every 6 hours PRN arthritis  oxyCODONE  5 mG/acetaminophen 325 mG 1 Tablet(s) Oral every 6 hours PRN Moderate Pain (4 - 6)  HYDROmorphone  Injectable 1 milliGRAM(s) IV Push every 4 hours PRN Severe Pain (7 - 10)        RADIOLOGY & ADDITIONAL TESTS:

## 2017-06-29 NOTE — DIETITIAN INITIAL EVALUATION ADULT. - ENERGY NEEDS
IBW 72.7Kg  %%  BMI 29.3    Utilized IBW to calculate needs, pt >120% of IBW. Needs adjusted for HIV and post-op.

## 2017-06-30 VITALS — TEMPERATURE: 99 F

## 2017-06-30 LAB
ANION GAP SERPL CALC-SCNC: 12 MMOL/L — SIGNIFICANT CHANGE UP (ref 5–17)
APTT BLD: 31.2 SEC — SIGNIFICANT CHANGE UP (ref 27.5–37.4)
BUN SERPL-MCNC: 20 MG/DL — SIGNIFICANT CHANGE UP (ref 7–23)
CALCIUM SERPL-MCNC: 10 MG/DL — SIGNIFICANT CHANGE UP (ref 8.4–10.5)
CHLORIDE SERPL-SCNC: 100 MMOL/L — SIGNIFICANT CHANGE UP (ref 96–108)
CO2 SERPL-SCNC: 24 MMOL/L — SIGNIFICANT CHANGE UP (ref 22–31)
CREAT SERPL-MCNC: 1.1 MG/DL — SIGNIFICANT CHANGE UP (ref 0.5–1.3)
GLUCOSE SERPL-MCNC: 100 MG/DL — HIGH (ref 70–99)
HCT VFR BLD CALC: 43.9 % — SIGNIFICANT CHANGE UP (ref 39–50)
HGB BLD-MCNC: 15.2 G/DL — SIGNIFICANT CHANGE UP (ref 13–17)
INR BLD: 0.99 — SIGNIFICANT CHANGE UP (ref 0.88–1.16)
MAGNESIUM SERPL-MCNC: 2.1 MG/DL — SIGNIFICANT CHANGE UP (ref 1.6–2.6)
MCHC RBC-ENTMCNC: 29.7 PG — SIGNIFICANT CHANGE UP (ref 27–34)
MCHC RBC-ENTMCNC: 34.6 G/DL — SIGNIFICANT CHANGE UP (ref 32–36)
MCV RBC AUTO: 85.7 FL — SIGNIFICANT CHANGE UP (ref 80–100)
PLATELET # BLD AUTO: 227 K/UL — SIGNIFICANT CHANGE UP (ref 150–400)
POTASSIUM SERPL-MCNC: 4.2 MMOL/L — SIGNIFICANT CHANGE UP (ref 3.5–5.3)
POTASSIUM SERPL-SCNC: 4.2 MMOL/L — SIGNIFICANT CHANGE UP (ref 3.5–5.3)
PROTHROM AB SERPL-ACNC: 11 SEC — SIGNIFICANT CHANGE UP (ref 9.8–12.7)
RBC # BLD: 5.12 M/UL — SIGNIFICANT CHANGE UP (ref 4.2–5.8)
RBC # FLD: 12.7 % — SIGNIFICANT CHANGE UP (ref 10.3–16.9)
SODIUM SERPL-SCNC: 136 MMOL/L — SIGNIFICANT CHANGE UP (ref 135–145)
WBC # BLD: 14.6 K/UL — HIGH (ref 3.8–10.5)
WBC # FLD AUTO: 14.6 K/UL — HIGH (ref 3.8–10.5)

## 2017-06-30 PROCEDURE — 71010: CPT | Mod: 26

## 2017-06-30 PROCEDURE — 71010: CPT | Mod: 26,77

## 2017-06-30 RX ORDER — ROSUVASTATIN CALCIUM 5 MG/1
1 TABLET ORAL
Qty: 30 | Refills: 0 | OUTPATIENT
Start: 2017-06-30 | End: 2017-07-30

## 2017-06-30 RX ORDER — FENOFIBRATE,MICRONIZED 130 MG
0 CAPSULE ORAL
Qty: 0 | Refills: 0 | COMMUNITY

## 2017-06-30 RX ORDER — BACLOFEN 100 %
1 POWDER (GRAM) MISCELLANEOUS
Qty: 30 | Refills: 0 | OUTPATIENT
Start: 2017-06-30 | End: 2017-07-30

## 2017-06-30 RX ORDER — DOLUTEGRAVIR SODIUM 25 MG/1
1 TABLET, FILM COATED ORAL
Qty: 30 | Refills: 0 | OUTPATIENT
Start: 2017-06-30 | End: 2017-07-30

## 2017-06-30 RX ORDER — ATENOLOL 25 MG/1
1 TABLET ORAL
Qty: 30 | Refills: 0 | OUTPATIENT
Start: 2017-06-30 | End: 2017-07-30

## 2017-06-30 RX ORDER — LAMOTRIGINE 25 MG/1
2 TABLET, ORALLY DISINTEGRATING ORAL
Qty: 0 | Refills: 0 | COMMUNITY

## 2017-06-30 RX ORDER — RITONAVIR 100 MG/1
1 TABLET, FILM COATED ORAL
Qty: 30 | Refills: 0 | OUTPATIENT
Start: 2017-06-30 | End: 2017-07-30

## 2017-06-30 RX ORDER — DARUNAVIR 75 MG/1
1 TABLET, FILM COATED ORAL
Qty: 30 | Refills: 0 | OUTPATIENT
Start: 2017-06-30 | End: 2017-07-30

## 2017-06-30 RX ORDER — ESOMEPRAZOLE MAGNESIUM 40 MG/1
1 CAPSULE, DELAYED RELEASE ORAL
Qty: 30 | Refills: 0 | OUTPATIENT
Start: 2017-06-30 | End: 2017-07-30

## 2017-06-30 RX ORDER — BENZOCAINE AND MENTHOL 5; 1 G/100ML; G/100ML
1 LIQUID ORAL THREE TIMES A DAY
Qty: 0 | Refills: 0 | Status: DISCONTINUED | OUTPATIENT
Start: 2017-06-30 | End: 2017-06-30

## 2017-06-30 RX ORDER — ATENOLOL AND CHLORTHALIDONE 50; 25 MG/1; MG/1
1 TABLET ORAL
Qty: 30 | Refills: 0 | OUTPATIENT
Start: 2017-06-30 | End: 2017-07-30

## 2017-06-30 RX ORDER — SPIRONOLACTONE 25 MG/1
1 TABLET, FILM COATED ORAL
Qty: 30 | Refills: 0 | OUTPATIENT
Start: 2017-06-30 | End: 2017-07-30

## 2017-06-30 RX ORDER — IBUPROFEN 200 MG
1 TABLET ORAL
Qty: 21 | Refills: 0 | OUTPATIENT
Start: 2017-06-30 | End: 2017-07-07

## 2017-06-30 RX ORDER — LAMOTRIGINE 25 MG/1
2 TABLET, ORALLY DISINTEGRATING ORAL
Qty: 60 | Refills: 0 | OUTPATIENT
Start: 2017-06-30 | End: 2017-07-30

## 2017-06-30 RX ORDER — OXYCODONE HYDROCHLORIDE 5 MG/1
1 TABLET ORAL
Qty: 14 | Refills: 0 | OUTPATIENT
Start: 2017-06-30 | End: 2017-07-07

## 2017-06-30 RX ORDER — FENOFIBRATE,MICRONIZED 130 MG
1 CAPSULE ORAL
Qty: 30 | Refills: 0 | OUTPATIENT
Start: 2017-06-30 | End: 2017-07-30

## 2017-06-30 RX ORDER — AZELASTINE 137 UG/1
0 SPRAY, METERED NASAL
Qty: 0 | Refills: 0 | COMMUNITY

## 2017-06-30 RX ORDER — BACLOFEN 100 %
0 POWDER (GRAM) MISCELLANEOUS
Qty: 0 | Refills: 0 | COMMUNITY

## 2017-06-30 RX ADMIN — DARUNAVIR 800 MILLIGRAM(S): 75 TABLET, FILM COATED ORAL at 14:49

## 2017-06-30 RX ADMIN — Medication 10 MILLIGRAM(S): at 12:20

## 2017-06-30 RX ADMIN — PANTOPRAZOLE SODIUM 40 MILLIGRAM(S): 20 TABLET, DELAYED RELEASE ORAL at 06:27

## 2017-06-30 RX ADMIN — HEPARIN SODIUM 5000 UNIT(S): 5000 INJECTION INTRAVENOUS; SUBCUTANEOUS at 13:30

## 2017-06-30 RX ADMIN — HYDROMORPHONE HYDROCHLORIDE 1 MILLIGRAM(S): 2 INJECTION INTRAMUSCULAR; INTRAVENOUS; SUBCUTANEOUS at 10:10

## 2017-06-30 RX ADMIN — HEPARIN SODIUM 5000 UNIT(S): 5000 INJECTION INTRAVENOUS; SUBCUTANEOUS at 06:27

## 2017-06-30 RX ADMIN — LIDOCAINE 1 PATCH: 4 CREAM TOPICAL at 08:58

## 2017-06-30 RX ADMIN — RITONAVIR 100 MILLIGRAM(S): 100 TABLET, FILM COATED ORAL at 12:19

## 2017-06-30 RX ADMIN — SPIRONOLACTONE 25 MILLIGRAM(S): 25 TABLET, FILM COATED ORAL at 06:27

## 2017-06-30 RX ADMIN — BENZOCAINE AND MENTHOL 1 LOZENGE: 5; 1 LIQUID ORAL at 12:20

## 2017-06-30 RX ADMIN — HYDROMORPHONE HYDROCHLORIDE 1 MILLIGRAM(S): 2 INJECTION INTRAMUSCULAR; INTRAVENOUS; SUBCUTANEOUS at 03:05

## 2017-06-30 RX ADMIN — DOLUTEGRAVIR SODIUM 50 MILLIGRAM(S): 25 TABLET, FILM COATED ORAL at 12:19

## 2017-06-30 RX ADMIN — Medication 100 MILLIGRAM(S): at 06:27

## 2017-06-30 RX ADMIN — Medication 1 SPRAY(S): at 13:30

## 2017-06-30 RX ADMIN — LAMOTRIGINE 300 MILLIGRAM(S): 25 TABLET, ORALLY DISINTEGRATING ORAL at 09:04

## 2017-06-30 RX ADMIN — Medication 1 MILLIGRAM(S): at 08:59

## 2017-06-30 RX ADMIN — Medication 100 MILLIGRAM(S): at 13:33

## 2017-06-30 RX ADMIN — ATENOLOL 50 MILLIGRAM(S): 25 TABLET ORAL at 08:58

## 2017-06-30 RX ADMIN — HYDROMORPHONE HYDROCHLORIDE 1 MILLIGRAM(S): 2 INJECTION INTRAMUSCULAR; INTRAVENOUS; SUBCUTANEOUS at 02:50

## 2017-06-30 RX ADMIN — HYDROMORPHONE HYDROCHLORIDE 1 MILLIGRAM(S): 2 INJECTION INTRAMUSCULAR; INTRAVENOUS; SUBCUTANEOUS at 09:04

## 2017-06-30 NOTE — DISCHARGE NOTE ADULT - CARE PROVIDERS DIRECT ADDRESSES
[email or web address]

## 2017-06-30 NOTE — DISCHARGE NOTE ADULT - HOSPITAL COURSE
This 57 year old male with PMH significant fro tobacco abuse, substance abuse, chronic bronchitic, HLD, HTN, GERD/Hou's esophagus, aniety, biopolar disorder, narcotics dependent from prior surgeries of laminectomy, right hip surgeries, and carpel tunnel surgery presented to thoracic surgery with findings of pneumothorax that was spontaneous and tension-like.      gentleman, a remote smoker 17 years ago, ETOH/Crack/Marijuana 30years ago with a past medical history pertinent for chronic bronchitis, HLD, HTN, GERD, Mac's Esophagus, Anxiety, Bipolar disorder, narcotics dependent secondary to laminectomy x2 (2001), R. hip surgery x3 (2014) secondary to staph infection/vascular necrosis, s/p carpel tunnel surgery feb/march 2017 who reported he was being treated for bronchitis with a Z-Pac since last thursday 6/22/17 and became acutely SOB while climbing stairs today. He presented to [facility] and CXR revealed he had a large left pneumothorax for which a pigtail was placed.  Patient was transferred to [facility] for further evaluation. This 57 year old male with PMH significant fro tobacco abuse, substance abuse, chronic bronchitic, HLD, HTN, GERD/Hou's esophagus, aniety, biopolar disorder, narcotics dependent from prior surgeries of laminectomy, right hip surgeries, and carpel tunnel surgery presented to thoracic surgery with findings of pneumothorax that was spontaneous and tension-like.  A pigtail was placed and he was transferred to [facility] for further care.  During his hospitalization, there was air leakage through the chest tube.  Patient was thought to be a candidate for surgical intervention.  On 06/28/17, patient underwent left video assisted thoracoscopic surgery with blebectomy, pleurectomy and mechanical pleurodesis.  Patient tolerated the procedure well; for further details, please refer the operative reports.  Postoperatively, patient was extubated in the operative room and continues being monitored in the PACU.  There was a chest tube remaining on suction. On POD#1, the following CXR indicated his lung expanded.  On POD#2, patient's chest tube was placed on water seal.  His lungs remain expanded.  Subsequently, the tube was removed with difficulty.  Patient is hemodynamically stable.  There is a tie down after the tube was removed, which will be removed during the office visit.  Patient is discharged to home for further care.

## 2017-06-30 NOTE — DISCHARGE NOTE ADULT - CARE PROVIDER_API CALL
Fortino Mcgill (MD), Thoracic Surgery  [address] [place] [address]  Phone: [phone]  Fax: [phone]

## 2017-06-30 NOTE — DISCHARGE NOTE ADULT - PLAN OF CARE
s/p left VATS blebectomy/pleurectomy follow up with Dr. Mcgill on 07/06/17/  please call [phone] to confirm the appointment  regular activities per patient tolerance.    regular diet continue current treatment regimen resume home medications. follow up with Dr. Mcgill on 07/06/17 at 2:30 please call [phone] to confirm the appointment  regular activities per patient tolerance.    regular diet

## 2017-06-30 NOTE — DISCHARGE NOTE ADULT - MEDICATION SUMMARY - MEDICATIONS TO TAKE
I will START or STAY ON the medications listed below when I get home from the hospital:    spironolactone 25 mg oral tablet  -- 1 tab(s) by mouth once a day  -- Indication: For Blood pressure     acetaminophen-oxycodone 325 mg-5 mg oral tablet  -- 1 tab(s) by mouth every 12 hours, As Needed, Moderate Pain (4 - 6) MDD:no more than 4 tabs daily  -- Indication: For Pain management     Ativan 0.5 mg oral tablet  -- 1 tab(s) by mouth once a day MDD:no more than 2 tab  -- Indication: For Anxiety    LaMICtal 150 mg oral tablet  -- 2 tab(s) by mouth once a day  -- Indication: For Seizure medication     Crestor 10 mg oral tablet  -- 1 tab(s) by mouth once a day (at bedtime)  -- Indication: For Cholesterol med    fenofibrate 30 mg oral capsule  -- 1 cap(s) by mouth once a day  -- Do not chew, break, or crush.  Obtain medical advice before taking any non-prescription drugs as some may affect the action of this medication.  Swallow whole.  Do not crush.    -- Indication: For Cholesterol medication    atenolol-chlorthalidone 50 mg-25 mg oral tablet  -- 1 tab(s) by mouth once a day  -- Indication: For Blood pressure med    Prezista 800 mg oral tablet  -- 1 tab(s) by mouth once a day  -- Check with your doctor before becoming pregnant.  It is very important that you take or use this exactly as directed.  Do not skip doses or discontinue unless directed by your doctor.  Obtain medical advice before taking any non-prescription drugs as some may affect the action of this medication.  Swallow whole.  Do not crush.  Take with food or milk.    -- Indication: For HIV med    Tivicay 50 mg oral tablet  -- 1 tab(s) by mouth once a day  -- Check with your doctor before becoming pregnant.  Do not take dairy products, antacids, or iron preparations within one hour of this medication.  It is very important that you take or use this exactly as directed.  Do not skip doses or discontinue unless directed by your doctor.  Obtain medical advice before taking any non-prescription drugs as some may affect the action of this medication.  Other drugs may decrease the effect of this prescription.  Contact your physician for further advice.    -- Indication: For HIV med    Norvir 100 mg oral tablet  -- 1 tab(s) by mouth once a day  -- Check with your doctor before becoming pregnant.  Do not chew, break, or crush.  It is very important that you take or use this exactly as directed.  Do not skip doses or discontinue unless directed by your doctor.  Obtain medical advice before taking any non-prescription drugs as some may affect the action of this medication.  Swallow whole.  Do not crush.  Take with food.    -- Indication: For HIV med    baclofen 10 mg oral tablet  -- 1 tab(s) by mouth once a day  -- It is very important that you take or use this exactly as directed.  Do not skip doses or discontinue unless directed by your doctor.  May cause drowsiness.  Alcohol may intensify this effect.  Use care when operating dangerous machinery.  Obtain medical advice before taking any non-prescription drugs as some may affect the action of this medication.    -- Indication: For Muscle relaxant     NexIUM 40 mg oral delayed release capsule  -- 1 cap(s) by mouth once a day  -- Indication: For Acid reflux

## 2017-06-30 NOTE — DISCHARGE NOTE ADULT - CARE PLAN
Principal Discharge DX:	Pneumothorax, spontaneous, tension  Goal:	s/p left VATS blebectomy/pleurectomy  Instructions for follow-up, activity and diet:	follow up with Dr. Mcgill on 07/06/17/  please call [phone] to confirm the appointment  regular activities per patient tolerance.    regular diet  Secondary Diagnosis:	HIV (human immunodeficiency virus infection)  Instructions for follow-up, activity and diet:	continue current treatment regimen  Secondary Diagnosis:	HTN (hypertension)  Secondary Diagnosis:	Arthritis  Instructions for follow-up, activity and diet:	resume home medications. Principal Discharge DX:	Pneumothorax, spontaneous, tension  Goal:	s/p left VATS blebectomy/pleurectomy  Instructions for follow-up, activity and diet:	follow up with Dr. Mcgill on 07/06/17/  please call [phone] to confirm the appointment  regular activities per patient tolerance.    regular diet  Secondary Diagnosis:	HIV (human immunodeficiency virus infection)  Instructions for follow-up, activity and diet:	continue current treatment regimen  Secondary Diagnosis:	HTN (hypertension)  Secondary Diagnosis:	Arthritis  Instructions for follow-up, activity and diet:	resume home medications. Principal Discharge DX:	Pneumothorax, spontaneous, tension  Goal:	s/p left VATS blebectomy/pleurectomy  Instructions for follow-up, activity and diet:	follow up with Dr. Mcgill on 07/06/17 at 2:30 please call [phone] to confirm the appointment  regular activities per patient tolerance.    regular diet  Secondary Diagnosis:	HIV (human immunodeficiency virus infection)  Instructions for follow-up, activity and diet:	continue current treatment regimen  Secondary Diagnosis:	HTN (hypertension)  Secondary Diagnosis:	Arthritis  Instructions for follow-up, activity and diet:	resume home medications.

## 2017-06-30 NOTE — DISCHARGE NOTE ADULT - MEDICATION SUMMARY - MEDICATIONS TO STOP TAKING
I will STOP taking the medications listed below when I get home from the hospital:    ibuprofen 600 mg oral tablet  -- 1 tab(s) by mouth every 6 hours as needed for fever/pain  -- Do not take this drug if you are pregnant.  It is very important that you take or use this exactly as directed.  Do not skip doses or discontinue unless directed by your doctor.  May cause drowsiness or dizziness.  Obtain medical advice before taking any non-prescription drugs as some may affect the action of this medication.  Take with food or milk.

## 2017-06-30 NOTE — PROGRESS NOTE ADULT - SUBJECTIVE AND OBJECTIVE BOX
Patient discussed on morning rounds with Dr. Mcgill     Operation / Date: : 6/28/17 left robotic assisted blebectomy and pleurectomy/mechanical pleurodesis.      Surgeon: Artem     SUBJECTIVE ASSESSMENT:  57y Male reports feeling much better after chest tube was removed. States that he feels like he is able to take deeper breathes since tube removal. Denies feeling SOB. No other complaints at this time.     Hospital Course:  This 57 year old male with PMH significant fro tobacco abuse, substance abuse, chronic bronchitic, HLD, HTN, GERD/Hou's esophagus, aniety, biopolar disorder, narcotics dependent from prior surgeries of laminectomy, right hip surgeries, and carpel tunnel surgery presented to thoracic surgery with findings of pneumothorax that was spontaneous and tension-like.  A pigtail was placed and he was transferred to [facility] for further care.  During his hospitalization, there was air leakage through the chest tube.  Patient was thought to be a candidate for surgical intervention.  On 06/28/17, patient underwent left video assisted thoracoscopic surgery with blebectomy, pleurectomy and mechanical pleurodesis.  Patient tolerated the procedure well; for further details, please refer the operative reports.  Postoperatively, patient was extubated in the operative room and continues being monitored in the PACU.  There was a chest tube remaining on suction. On POD#1, the following CXR indicated his lung expanded.  On POD#2, patient's chest tube was placed on water seal.  His lungs remain expanded.  Subsequently, the tube was removed with difficulty.  Patient is hemodynamically stable.  There is a tie down after the tube was removed, which will be removed during the office visit.  Patient is discharged to home for further care.      Vital Signs Last 24 Hrs  T(C): 37.3 (30 Jun 2017 14:29), Max: 37.3 (30 Jun 2017 14:29)  T(F): 99.2 (30 Jun 2017 14:29), Max: 99.2 (30 Jun 2017 14:29)  HR: 62 (30 Jun 2017 13:38) (56 - 76)  BP: 132/66 (30 Jun 2017 13:38) (132/66 - 172/82)  BP(mean): 90 (30 Jun 2017 13:38) (90 - 114)  RR: 20 (30 Jun 2017 13:38) (16 - 20)  SpO2: 98% (30 Jun 2017 13:38) (96% - 99%)  I&O's Detail    29 Jun 2017 07:01  -  30 Jun 2017 07:00  --------------------------------------------------------  IN:    IV PiggyBack: 50 mL    Oral Fluid: 220 mL  Total IN: 270 mL    OUT:    Chest Tube: 52 mL    Voided: 1425 mL  Total OUT: 1477 mL    Total NET: -1207 mL      30 Jun 2017 07:01  -  30 Jun 2017 14:38  --------------------------------------------------------  IN:    Oral Fluid: 200 mL  Total IN: 200 mL    OUT:    Chest Tube: 5 mL    Voided: 600 mL  Total OUT: 605 mL    Total NET: -405 mL          EPICARDIAL WIRES REMOVED: N/ A    TIE DOWNS REMOVED: No.    PHYSICAL EXAM:    General: AOx3 in no acute distress.     Neurological: No focal nuero deficit.    Cardiovascular: RRR no mumurs rubs or gallops     Respiratory: right lung with scattered rhonchi mild crepitus at chest tube insertion site. Left lung clear to ausculation.     Gastrointestinal: soft non tender non distended. normal bowel sounds    Extremities: WWP no peripheral edema.     Vascular: 2+ distal pulses      LABS:                        15.2   14.6  )-----------( 227      ( 30 Jun 2017 06:13 )             43.9           PT/INR - ( 30 Jun 2017 06:13 )   PT: 11.0 sec;   INR: 0.99          PTT - ( 30 Jun 2017 06:13 )  PTT:31.2 sec    06-30    136  |  100  |  20  ----------------------------<  100<H>  4.2   |  24  |  1.10    Ca    10.0      30 Jun 2017 06:13  Mg     2.1     06-30            MEDICATIONS  (STANDING):  lactated ringers. 1000 milliLiter(s) (50 mL/Hr) IV Continuous <Continuous>  docusate sodium 100 milliGRAM(s) Oral three times a day  dolutegravir 50 milliGRAM(s) Oral daily  darunavir 800 milliGRAM(s) Oral daily  ritonavir Tablet 100 milliGRAM(s) Oral daily  heparin  Injectable 5000 Unit(s) SubCutaneous every 8 hours  pantoprazole    Tablet 40 milliGRAM(s) Oral before breakfast  atorvastatin 20 milliGRAM(s) Oral at bedtime  ATENolol  Tablet 50 milliGRAM(s) Oral daily  lamoTRIgine 300 milliGRAM(s) Oral daily  baclofen 10 milliGRAM(s) Oral daily  lidocaine   Patch 1 Patch Transdermal every 24 hours  spironolactone 25 milliGRAM(s) Oral daily  artificial  tears Solution 1 Drop(s) Right EYE every 4 hours      Discharge CXR: Chest xray with small left apical air space.     Discharge ECHO: n/a

## 2017-06-30 NOTE — DISCHARGE NOTE ADULT - ADDITIONAL INSTRUCTIONS
-Please follow up with Dr. Mcgill on 07/06/17.  The office is located at [facility], [facility], 4th floor. Call us to confirm the appointment at #[phone].    -Walk daily as tolerated and use your incentive spirometer every hour.    -No driving or strenuous activity/exercise for 6 weeks, or until cleared by your surgeon.    -Gently clean your incisions with anti-bacterial soap and water, pat dry.  You may leave them open to air.    -Call your doctor if you have shortness of breath, chest pain not relieved by pain medication, dizziness, fever >101.5, or increased redness or drainage from incisions.

## 2017-06-30 NOTE — DISCHARGE NOTE ADULT - PATIENT PORTAL LINK FT
“You can access the FollowHealth Patient Portal, offered by [facility], by registering with the following website: [email or web address]”

## 2017-07-02 LAB
-  CEFAZOLIN: SIGNIFICANT CHANGE UP
-  CLINDAMYCIN: SIGNIFICANT CHANGE UP
-  ERYTHROMYCIN: SIGNIFICANT CHANGE UP
-  LINEZOLID: SIGNIFICANT CHANGE UP
-  OXACILLIN: SIGNIFICANT CHANGE UP
-  PENICILLIN: SIGNIFICANT CHANGE UP
-  RIFAMPIN: SIGNIFICANT CHANGE UP
-  TRIMETHOPRIM/SULFAMETHOXAZOLE: SIGNIFICANT CHANGE UP
-  VANCOMYCIN: SIGNIFICANT CHANGE UP
CULTURE RESULTS: SIGNIFICANT CHANGE UP
METHOD TYPE: SIGNIFICANT CHANGE UP
ORGANISM # SPEC MICROSCOPIC CNT: SIGNIFICANT CHANGE UP
ORGANISM # SPEC MICROSCOPIC CNT: SIGNIFICANT CHANGE UP
SPECIMEN SOURCE: SIGNIFICANT CHANGE UP

## 2017-07-03 DIAGNOSIS — J93.83 OTHER PNEUMOTHORAX: ICD-10-CM

## 2017-07-03 DIAGNOSIS — Z87.891 PERSONAL HISTORY OF NICOTINE DEPENDENCE: ICD-10-CM

## 2017-07-03 DIAGNOSIS — Z21 ASYMPTOMATIC HUMAN IMMUNODEFICIENCY VIRUS [HIV] INFECTION STATUS: ICD-10-CM

## 2017-07-03 DIAGNOSIS — J43.8 OTHER EMPHYSEMA: ICD-10-CM

## 2017-07-03 DIAGNOSIS — I10 ESSENTIAL (PRIMARY) HYPERTENSION: ICD-10-CM

## 2017-07-03 DIAGNOSIS — J42 UNSPECIFIED CHRONIC BRONCHITIS: ICD-10-CM

## 2017-07-03 DIAGNOSIS — F41.9 ANXIETY DISORDER, UNSPECIFIED: ICD-10-CM

## 2017-07-03 DIAGNOSIS — M19.90 UNSPECIFIED OSTEOARTHRITIS, UNSPECIFIED SITE: ICD-10-CM

## 2017-07-03 DIAGNOSIS — K21.9 GASTRO-ESOPHAGEAL REFLUX DISEASE WITHOUT ESOPHAGITIS: ICD-10-CM

## 2017-07-03 DIAGNOSIS — K22.70 BARRETT'S ESOPHAGUS WITHOUT DYSPLASIA: ICD-10-CM

## 2017-07-03 DIAGNOSIS — F31.9 BIPOLAR DISORDER, UNSPECIFIED: ICD-10-CM

## 2017-07-03 DIAGNOSIS — E78.5 HYPERLIPIDEMIA, UNSPECIFIED: ICD-10-CM

## 2017-07-03 DIAGNOSIS — Z87.898 PERSONAL HISTORY OF OTHER SPECIFIED CONDITIONS: ICD-10-CM

## 2017-07-03 DIAGNOSIS — Z96.641 PRESENCE OF RIGHT ARTIFICIAL HIP JOINT: ICD-10-CM

## 2017-07-03 LAB — SURGICAL PATHOLOGY STUDY: SIGNIFICANT CHANGE UP

## 2017-07-03 PROCEDURE — 83036 HEMOGLOBIN GLYCOSYLATED A1C: CPT

## 2017-07-03 PROCEDURE — 85025 COMPLETE CBC W/AUTO DIFF WBC: CPT

## 2017-07-03 PROCEDURE — 85730 THROMBOPLASTIN TIME PARTIAL: CPT

## 2017-07-03 PROCEDURE — 84484 ASSAY OF TROPONIN QUANT: CPT

## 2017-07-03 PROCEDURE — 96375 TX/PRO/DX INJ NEW DRUG ADDON: CPT | Mod: XU

## 2017-07-03 PROCEDURE — 36415 COLL VENOUS BLD VENIPUNCTURE: CPT

## 2017-07-03 PROCEDURE — 85610 PROTHROMBIN TIME: CPT

## 2017-07-03 PROCEDURE — S2900: CPT

## 2017-07-03 PROCEDURE — 71046 X-RAY EXAM CHEST 2 VIEWS: CPT

## 2017-07-03 PROCEDURE — 88305 TISSUE EXAM BY PATHOLOGIST: CPT

## 2017-07-03 PROCEDURE — 87102 FUNGUS ISOLATION CULTURE: CPT

## 2017-07-03 PROCEDURE — 86901 BLOOD TYPING SEROLOGIC RH(D): CPT

## 2017-07-03 PROCEDURE — 96374 THER/PROPH/DIAG INJ IV PUSH: CPT | Mod: XU

## 2017-07-03 PROCEDURE — 84443 ASSAY THYROID STIM HORMONE: CPT

## 2017-07-03 PROCEDURE — 80061 LIPID PANEL: CPT

## 2017-07-03 PROCEDURE — 81003 URINALYSIS AUTO W/O SCOPE: CPT

## 2017-07-03 PROCEDURE — 87186 SC STD MICRODIL/AGAR DIL: CPT

## 2017-07-03 PROCEDURE — 83735 ASSAY OF MAGNESIUM: CPT

## 2017-07-03 PROCEDURE — 87070 CULTURE OTHR SPECIMN AEROBIC: CPT

## 2017-07-03 PROCEDURE — 80048 BASIC METABOLIC PNL TOTAL CA: CPT

## 2017-07-03 PROCEDURE — 87075 CULTR BACTERIA EXCEPT BLOOD: CPT

## 2017-07-03 PROCEDURE — 87206 SMEAR FLUORESCENT/ACID STAI: CPT

## 2017-07-03 PROCEDURE — 83615 LACTATE (LD) (LDH) ENZYME: CPT

## 2017-07-03 PROCEDURE — 71250 CT THORAX DX C-: CPT

## 2017-07-03 PROCEDURE — 88307 TISSUE EXAM BY PATHOLOGIST: CPT

## 2017-07-03 PROCEDURE — 99152 MOD SED SAME PHYS/QHP 5/>YRS: CPT | Mod: XU

## 2017-07-03 PROCEDURE — 86900 BLOOD TYPING SEROLOGIC ABO: CPT

## 2017-07-03 PROCEDURE — 86850 RBC ANTIBODY SCREEN: CPT

## 2017-07-03 PROCEDURE — 83605 ASSAY OF LACTIC ACID: CPT

## 2017-07-03 PROCEDURE — 32551 INSERTION OF CHEST TUBE: CPT

## 2017-07-03 PROCEDURE — 80053 COMPREHEN METABOLIC PANEL: CPT

## 2017-07-03 PROCEDURE — 71045 X-RAY EXAM CHEST 1 VIEW: CPT

## 2017-07-03 PROCEDURE — 86923 COMPATIBILITY TEST ELECTRIC: CPT

## 2017-07-03 PROCEDURE — 84100 ASSAY OF PHOSPHORUS: CPT

## 2017-07-03 PROCEDURE — 87015 SPECIMEN INFECT AGNT CONCNTJ: CPT

## 2017-07-03 PROCEDURE — 99285 EMERGENCY DEPT VISIT HI MDM: CPT | Mod: 25

## 2017-07-03 PROCEDURE — 87116 MYCOBACTERIA CULTURE: CPT

## 2017-07-03 PROCEDURE — 87252 VIRUS INOCULATION TISSUE: CPT

## 2017-07-03 PROCEDURE — C1889: CPT

## 2017-07-03 PROCEDURE — 83880 ASSAY OF NATRIURETIC PEPTIDE: CPT

## 2017-07-03 PROCEDURE — 85027 COMPLETE CBC AUTOMATED: CPT

## 2017-07-03 PROCEDURE — 93005 ELECTROCARDIOGRAM TRACING: CPT

## 2017-07-05 RX ORDER — LORAZEPAM 0.5 MG/1
0.5 TABLET ORAL
Refills: 0 | Status: ACTIVE | COMMUNITY
Start: 2017-07-05

## 2017-07-05 RX ORDER — FENOFIBRATE 50 MG/1
50 CAPSULE ORAL DAILY
Refills: 0 | Status: ACTIVE | COMMUNITY
Start: 2017-07-05

## 2017-07-05 RX ORDER — ATENOLOL AND CHLORTHALIDONE 50; 25 MG/1; MG/1
50-25 TABLET ORAL DAILY
Refills: 0 | Status: ACTIVE | COMMUNITY
Start: 2017-07-05

## 2017-07-05 RX ORDER — LAMOTRIGINE 150 MG/1
150 TABLET ORAL
Refills: 0 | Status: ACTIVE | COMMUNITY
Start: 2017-07-05

## 2017-07-06 ENCOUNTER — APPOINTMENT (OUTPATIENT)
Dept: THORACIC SURGERY | Facility: CLINIC | Age: 57
End: 2017-07-06

## 2017-07-06 ENCOUNTER — OUTPATIENT (OUTPATIENT)
Dept: OUTPATIENT SERVICES | Facility: HOSPITAL | Age: 57
LOS: 1 days | End: 2017-07-06
Payer: MEDICARE

## 2017-07-06 VITALS
TEMPERATURE: 97.7 F | OXYGEN SATURATION: 96 % | DIASTOLIC BLOOD PRESSURE: 80 MMHG | SYSTOLIC BLOOD PRESSURE: 131 MMHG | RESPIRATION RATE: 18 BRPM | WEIGHT: 188 LBS | HEART RATE: 88 BPM

## 2017-07-06 DIAGNOSIS — J84.10 PULMONARY FIBROSIS, UNSPECIFIED: ICD-10-CM

## 2017-07-06 DIAGNOSIS — Z98.890 OTHER SPECIFIED POSTPROCEDURAL STATES: Chronic | ICD-10-CM

## 2017-07-06 PROCEDURE — 71020: CPT | Mod: 26

## 2017-07-06 PROCEDURE — 71046 X-RAY EXAM CHEST 2 VIEWS: CPT

## 2017-07-07 PROBLEM — J84.10 LUNG FIBROSIS: Status: ACTIVE | Noted: 2017-07-07

## 2017-07-07 LAB — VIRUS SPEC CULT: SIGNIFICANT CHANGE UP

## 2017-07-29 LAB
CULTURE RESULTS: SIGNIFICANT CHANGE UP
SPECIMEN SOURCE: SIGNIFICANT CHANGE UP

## 2017-08-01 PROBLEM — J43.9 BLEB, LUNG: Status: ACTIVE | Noted: 2017-08-01

## 2017-08-04 ENCOUNTER — APPOINTMENT (OUTPATIENT)
Dept: THORACIC SURGERY | Facility: CLINIC | Age: 57
End: 2017-08-04
Payer: MEDICARE

## 2017-08-04 ENCOUNTER — OUTPATIENT (OUTPATIENT)
Dept: OUTPATIENT SERVICES | Facility: HOSPITAL | Age: 57
LOS: 1 days | End: 2017-08-04
Payer: MEDICARE

## 2017-08-04 VITALS
TEMPERATURE: 98 F | WEIGHT: 196 LBS | RESPIRATION RATE: 17 BRPM | DIASTOLIC BLOOD PRESSURE: 68 MMHG | SYSTOLIC BLOOD PRESSURE: 118 MMHG | HEART RATE: 69 BPM | OXYGEN SATURATION: 96 %

## 2017-08-04 DIAGNOSIS — Z98.890 OTHER SPECIFIED POSTPROCEDURAL STATES: Chronic | ICD-10-CM

## 2017-08-04 DIAGNOSIS — J43.9 EMPHYSEMA, UNSPECIFIED: ICD-10-CM

## 2017-08-04 PROCEDURE — 99024 POSTOP FOLLOW-UP VISIT: CPT

## 2017-08-04 PROCEDURE — 71020: CPT | Mod: 26

## 2017-08-04 PROCEDURE — 71046 X-RAY EXAM CHEST 2 VIEWS: CPT

## 2017-08-04 RX ORDER — SPIRONOLACTONE 25 MG/1
25 TABLET ORAL DAILY
Refills: 0 | Status: COMPLETED | COMMUNITY
Start: 2017-07-05 | End: 2017-08-04

## 2017-08-04 RX ORDER — OXYCODONE HYDROCHLORIDE AND ACETAMINOPHEN 5; 325 MG/1; MG/1
5-325 TABLET ORAL
Refills: 0 | Status: COMPLETED | COMMUNITY
Start: 2017-07-05 | End: 2017-08-04

## 2017-08-05 ENCOUNTER — EMERGENCY (EMERGENCY)
Facility: HOSPITAL | Age: 57
LOS: 1 days | Discharge: HOME | End: 2017-08-05
Attending: EMERGENCY MEDICINE | Admitting: EMERGENCY MEDICINE
Payer: MEDICARE

## 2017-08-05 VITALS
HEART RATE: 77 BPM | TEMPERATURE: 99 F | DIASTOLIC BLOOD PRESSURE: 83 MMHG | RESPIRATION RATE: 19 BRPM | SYSTOLIC BLOOD PRESSURE: 126 MMHG | OXYGEN SATURATION: 99 %

## 2017-08-05 DIAGNOSIS — B20 HUMAN IMMUNODEFICIENCY VIRUS [HIV] DISEASE: ICD-10-CM

## 2017-08-05 DIAGNOSIS — Z87.891 PERSONAL HISTORY OF NICOTINE DEPENDENCE: ICD-10-CM

## 2017-08-05 DIAGNOSIS — Z98.890 OTHER SPECIFIED POSTPROCEDURAL STATES: Chronic | ICD-10-CM

## 2017-08-05 DIAGNOSIS — Z79.899 OTHER LONG TERM (CURRENT) DRUG THERAPY: ICD-10-CM

## 2017-08-05 DIAGNOSIS — J20.9 ACUTE BRONCHITIS, UNSPECIFIED: ICD-10-CM

## 2017-08-05 DIAGNOSIS — R05 COUGH: ICD-10-CM

## 2017-08-05 PROCEDURE — 99283 EMERGENCY DEPT VISIT LOW MDM: CPT

## 2017-08-05 RX ORDER — AZITHROMYCIN 500 MG/1
1 TABLET, FILM COATED ORAL
Qty: 4 | Refills: 0 | OUTPATIENT
Start: 2017-08-05 | End: 2017-08-09

## 2017-08-05 RX ORDER — AZITHROMYCIN 500 MG/1
500 TABLET, FILM COATED ORAL ONCE
Qty: 0 | Refills: 0 | Status: COMPLETED | OUTPATIENT
Start: 2017-08-05 | End: 2017-08-05

## 2017-08-05 RX ADMIN — AZITHROMYCIN 500 MILLIGRAM(S): 500 TABLET, FILM COATED ORAL at 09:04

## 2017-08-05 NOTE — ED PROVIDER NOTE - OBJECTIVE STATEMENT
cough, yellow phlegm, recent bleb surgery at [place], CXR yesterday no infiltrate, requesting z-omar

## 2017-08-05 NOTE — ED ADULT TRIAGE NOTE - CHIEF COMPLAINT QUOTE
Pt to ER with cough and low grade fever x 1 day, pt HIV positive s/p left blebectomy and pleurectomy

## 2017-08-05 NOTE — ED PROVIDER NOTE - CARE PLAN
Principal Discharge DX:	Acute bronchitis  Secondary Diagnosis:	HIV (human immunodeficiency virus infection)

## 2017-08-05 NOTE — ED PROVIDER NOTE - MEDICAL DECISION MAKING DETAILS
requesting z-omar for yellow sputum, HIV, recent bleb surgery, chest clear, CXR neg yesterday as per pt, afebrile, close follow up recommended

## 2017-08-19 LAB
CULTURE RESULTS: SIGNIFICANT CHANGE UP
SPECIMEN SOURCE: SIGNIFICANT CHANGE UP

## 2017-08-29 ENCOUNTER — EMERGENCY (EMERGENCY)
Facility: HOSPITAL | Age: 57
LOS: 1 days | Discharge: HOME | End: 2017-08-29
Admitting: EMERGENCY MEDICINE
Payer: MEDICARE

## 2017-08-29 VITALS
WEIGHT: 195.99 LBS | HEIGHT: 69 IN | SYSTOLIC BLOOD PRESSURE: 136 MMHG | RESPIRATION RATE: 17 BRPM | OXYGEN SATURATION: 97 % | HEART RATE: 61 BPM | TEMPERATURE: 99 F | DIASTOLIC BLOOD PRESSURE: 81 MMHG

## 2017-08-29 DIAGNOSIS — J02.9 ACUTE PHARYNGITIS, UNSPECIFIED: ICD-10-CM

## 2017-08-29 DIAGNOSIS — I10 ESSENTIAL (PRIMARY) HYPERTENSION: ICD-10-CM

## 2017-08-29 DIAGNOSIS — Z98.890 OTHER SPECIFIED POSTPROCEDURAL STATES: Chronic | ICD-10-CM

## 2017-08-29 PROCEDURE — 99283 EMERGENCY DEPT VISIT LOW MDM: CPT

## 2017-08-29 NOTE — ED PROVIDER NOTE - ENMT, MLM
Airway patent, Nasal mucosa clear. Mouth with normal mucosa. Throat has no vesicles, no oropharyngeal exudates and uvula is midline. Airway patent, Nasal mucosa clear. Mouth with normal mucosa. Throat has no vesicles, no oropharyngeal exudates and uvula is midline. no LAD

## 2017-08-29 NOTE — ED ADULT TRIAGE NOTE - CHIEF COMPLAINT QUOTE
aox3 verbal, ambulatory well appearing c/o sore throat x2days denies any fever, tolerating po. unk sick contact.

## 2017-08-29 NOTE — ED PROVIDER NOTE - MEDICAL DECISION MAKING DETAILS
pharyngitis, probable viral, well appearing, does not meet centor criteria, nontoxic appearing, discussed supportive care, f/u PMD, return precautions reviewed.

## 2017-08-29 NOTE — ED PROVIDER NOTE - OBJECTIVE STATEMENT
57y F with h/o pleurectomy, aids (on Tivicay and Norvir), HTN (well controlled), presents with throat pain for 2 days. Pt admits to "lots of kissing recently." Notes mild abd pain 2 days ago (now resolved), mild pain with swallowing, ear pain. Denies fever, cough, loss of appetite, n/v/d. 57y F with h/o pleurectomy, HIV/AIDs(on meds, "high CD4, undetectable VL), HTN, presents with throat pain for 2 days. Pt reports he has been involved in "lots of kissing recently." Notes mild abd discomfort, "stomach upset" 2 days ago (now resolved), mild throat pain with swallowing. Denies fever, cough, loss of appetite, n/v/d.

## 2017-09-11 ENCOUNTER — EMERGENCY (EMERGENCY)
Facility: HOSPITAL | Age: 57
LOS: 1 days | Discharge: HOME | End: 2017-09-11
Admitting: EMERGENCY MEDICINE
Payer: MEDICARE

## 2017-09-11 VITALS
TEMPERATURE: 99 F | DIASTOLIC BLOOD PRESSURE: 68 MMHG | RESPIRATION RATE: 18 BRPM | HEART RATE: 75 BPM | OXYGEN SATURATION: 100 % | SYSTOLIC BLOOD PRESSURE: 139 MMHG

## 2017-09-11 VITALS
TEMPERATURE: 100 F | HEART RATE: 88 BPM | OXYGEN SATURATION: 97 % | RESPIRATION RATE: 16 BRPM | SYSTOLIC BLOOD PRESSURE: 144 MMHG | DIASTOLIC BLOOD PRESSURE: 75 MMHG

## 2017-09-11 DIAGNOSIS — Z88.8 ALLERGY STATUS TO OTHER DRUGS, MEDICAMENTS AND BIOLOGICAL SUBSTANCES: ICD-10-CM

## 2017-09-11 DIAGNOSIS — Z79.2 LONG TERM (CURRENT) USE OF ANTIBIOTICS: ICD-10-CM

## 2017-09-11 DIAGNOSIS — I10 ESSENTIAL (PRIMARY) HYPERTENSION: ICD-10-CM

## 2017-09-11 DIAGNOSIS — B20 HUMAN IMMUNODEFICIENCY VIRUS [HIV] DISEASE: ICD-10-CM

## 2017-09-11 DIAGNOSIS — Z98.890 OTHER SPECIFIED POSTPROCEDURAL STATES: Chronic | ICD-10-CM

## 2017-09-11 DIAGNOSIS — R50.9 FEVER, UNSPECIFIED: ICD-10-CM

## 2017-09-11 DIAGNOSIS — Z79.899 OTHER LONG TERM (CURRENT) DRUG THERAPY: ICD-10-CM

## 2017-09-11 LAB
ALBUMIN SERPL ELPH-MCNC: 4.2 G/DL — SIGNIFICANT CHANGE UP (ref 3.4–5)
ALP SERPL-CCNC: 58 U/L — SIGNIFICANT CHANGE UP (ref 40–120)
ALT FLD-CCNC: 30 U/L — SIGNIFICANT CHANGE UP (ref 12–42)
ANION GAP SERPL CALC-SCNC: 7 MMOL/L — LOW (ref 9–16)
APPEARANCE UR: CLEAR — SIGNIFICANT CHANGE UP
AST SERPL-CCNC: 26 U/L — SIGNIFICANT CHANGE UP (ref 15–37)
BASOPHILS NFR BLD AUTO: 0.6 % — SIGNIFICANT CHANGE UP (ref 0–2)
BILIRUB SERPL-MCNC: 0.4 MG/DL — SIGNIFICANT CHANGE UP (ref 0.2–1.2)
BILIRUB UR-MCNC: NEGATIVE — SIGNIFICANT CHANGE UP
BUN SERPL-MCNC: 13 MG/DL — SIGNIFICANT CHANGE UP (ref 7–23)
CALCIUM SERPL-MCNC: 9.1 MG/DL — SIGNIFICANT CHANGE UP (ref 8.5–10.5)
CHLORIDE SERPL-SCNC: 99 MMOL/L — SIGNIFICANT CHANGE UP (ref 96–108)
CO2 SERPL-SCNC: 32 MMOL/L — HIGH (ref 22–31)
COLOR SPEC: YELLOW — SIGNIFICANT CHANGE UP
CREAT SERPL-MCNC: 1.28 MG/DL — SIGNIFICANT CHANGE UP (ref 0.5–1.3)
DIFF PNL FLD: NEGATIVE — SIGNIFICANT CHANGE UP
EOSINOPHIL NFR BLD AUTO: 0.6 % — SIGNIFICANT CHANGE UP (ref 0–6)
FLUAV SPEC QL CULT: NEGATIVE — SIGNIFICANT CHANGE UP
FLUBV AG SPEC QL IA: NEGATIVE — SIGNIFICANT CHANGE UP
GLUCOSE SERPL-MCNC: 107 MG/DL — HIGH (ref 70–99)
GLUCOSE UR QL: NEGATIVE — SIGNIFICANT CHANGE UP
HCT VFR BLD CALC: 45.2 % — SIGNIFICANT CHANGE UP (ref 39–50)
HGB BLD-MCNC: 15.3 G/DL — SIGNIFICANT CHANGE UP (ref 13–17)
IMM GRANULOCYTES NFR BLD AUTO: 0.4 % — SIGNIFICANT CHANGE UP (ref 0–1.5)
INR BLD: 1.05 — SIGNIFICANT CHANGE UP (ref 0.88–1.16)
KETONES UR-MCNC: NEGATIVE — SIGNIFICANT CHANGE UP
LACTATE SERPL-SCNC: 2.2 MMOL/L — HIGH (ref 0.4–2)
LEUKOCYTE ESTERASE UR-ACNC: NEGATIVE — SIGNIFICANT CHANGE UP
LYMPHOCYTES # BLD AUTO: 7.6 % — LOW (ref 13–44)
MCHC RBC-ENTMCNC: 28.7 PG — SIGNIFICANT CHANGE UP (ref 27–34)
MCHC RBC-ENTMCNC: 33.8 G/DL — SIGNIFICANT CHANGE UP (ref 32–36)
MCV RBC AUTO: 84.8 FL — SIGNIFICANT CHANGE UP (ref 80–100)
MONOCYTES NFR BLD AUTO: 8.8 % — SIGNIFICANT CHANGE UP (ref 2–14)
NEUTROPHILS NFR BLD AUTO: 82 % — HIGH (ref 43–77)
NITRITE UR-MCNC: NEGATIVE — SIGNIFICANT CHANGE UP
PH UR: 7 — SIGNIFICANT CHANGE UP (ref 5–8)
PLATELET # BLD AUTO: 169 K/UL — SIGNIFICANT CHANGE UP (ref 150–400)
POTASSIUM SERPL-MCNC: 4 MMOL/L — SIGNIFICANT CHANGE UP (ref 3.5–5.3)
POTASSIUM SERPL-SCNC: 4 MMOL/L — SIGNIFICANT CHANGE UP (ref 3.5–5.3)
PROT SERPL-MCNC: 7.5 G/DL — SIGNIFICANT CHANGE UP (ref 6.4–8.2)
PROT UR-MCNC: NEGATIVE MG/DL — SIGNIFICANT CHANGE UP
PROTHROM AB SERPL-ACNC: 11.6 SEC — SIGNIFICANT CHANGE UP (ref 9.8–12.7)
RBC # BLD: 5.33 M/UL — SIGNIFICANT CHANGE UP (ref 4.2–5.8)
RBC # FLD: 12.6 % — SIGNIFICANT CHANGE UP (ref 10.3–16.9)
SODIUM SERPL-SCNC: 138 MMOL/L — SIGNIFICANT CHANGE UP (ref 132–145)
SP GR SPEC: 1.01 — SIGNIFICANT CHANGE UP (ref 1–1.03)
UROBILINOGEN FLD QL: 0.2 E.U./DL — SIGNIFICANT CHANGE UP
WBC # BLD: 7.2 K/UL — SIGNIFICANT CHANGE UP (ref 3.8–10.5)
WBC # FLD AUTO: 7.2 K/UL — SIGNIFICANT CHANGE UP (ref 3.8–10.5)

## 2017-09-11 PROCEDURE — 71020: CPT | Mod: 26

## 2017-09-11 PROCEDURE — 99284 EMERGENCY DEPT VISIT MOD MDM: CPT

## 2017-09-11 RX ORDER — SODIUM CHLORIDE 9 MG/ML
3 INJECTION INTRAMUSCULAR; INTRAVENOUS; SUBCUTANEOUS ONCE
Qty: 0 | Refills: 0 | Status: COMPLETED | OUTPATIENT
Start: 2017-09-11 | End: 2017-09-11

## 2017-09-11 RX ORDER — SODIUM CHLORIDE 9 MG/ML
1000 INJECTION INTRAMUSCULAR; INTRAVENOUS; SUBCUTANEOUS ONCE
Qty: 0 | Refills: 0 | Status: COMPLETED | OUTPATIENT
Start: 2017-09-11 | End: 2017-09-11

## 2017-09-11 RX ORDER — KETOROLAC TROMETHAMINE 30 MG/ML
30 SYRINGE (ML) INJECTION ONCE
Qty: 0 | Refills: 0 | Status: DISCONTINUED | OUTPATIENT
Start: 2017-09-11 | End: 2017-09-11

## 2017-09-11 RX ADMIN — Medication 30 MILLIGRAM(S): at 12:10

## 2017-09-11 RX ADMIN — Medication 30 MILLIGRAM(S): at 13:01

## 2017-09-11 RX ADMIN — SODIUM CHLORIDE 1000 MILLILITER(S): 9 INJECTION INTRAMUSCULAR; INTRAVENOUS; SUBCUTANEOUS at 12:10

## 2017-09-11 RX ADMIN — SODIUM CHLORIDE 3 MILLILITER(S): 9 INJECTION INTRAMUSCULAR; INTRAVENOUS; SUBCUTANEOUS at 12:10

## 2017-09-11 RX ADMIN — SODIUM CHLORIDE 1000 MILLILITER(S): 9 INJECTION INTRAMUSCULAR; INTRAVENOUS; SUBCUTANEOUS at 13:05

## 2017-09-11 NOTE — ED PROVIDER NOTE - OBJECTIVE STATEMENT
58 y/o M with PMH of Hou's esophagus, bipolar d/o, HTN, HIV on HAART, arthritis, bleb with spontaneous pneumothorax in June 2017 presents to ED c/o fever as high as 101 this am.  He notes a mild sore throat and head congestion.  He states his best friend whom he sees daily has similar symptoms.  He is s/p endoscopy 4 days ago.  He denies headache, cough, chest pain, abd pain, dysuria, hematuria, rash, recent travels.

## 2017-09-11 NOTE — ED ADULT NURSE NOTE - OBJECTIVE STATEMENT
Pt states he had an endoscopy on Friday. States he began having bilateral thigh pain yesterday morning. This morning around 300 am woke up with fever of 101. Pt took tylenol at 330 am. Pt denies chest pain, SOB or leg swelling. Will continue to monitor

## 2017-09-11 NOTE — ED PROVIDER NOTE - MEDICAL DECISION MAKING DETAILS
56 y/o M with PMH of HIV presents to ED c/o fever (tmax 101).  Pt well appearing. VSS. NAD.  temp 100.1 upon arrival.  normal WBC with lactate 2.2.  Pt well appearing.  Likely viral syndrome.  CXR negative.  Influenza screen and blood cultures ordered before discharge.  Pt advised to hydrate, rest and return precautions discussed.

## 2017-10-04 ENCOUNTER — OUTPATIENT (OUTPATIENT)
Dept: OUTPATIENT SERVICES | Facility: HOSPITAL | Age: 57
LOS: 1 days | End: 2017-10-04
Payer: MEDICARE

## 2017-10-04 DIAGNOSIS — Z98.890 OTHER SPECIFIED POSTPROCEDURAL STATES: Chronic | ICD-10-CM

## 2017-10-04 PROCEDURE — 71250 CT THORAX DX C-: CPT | Mod: 26

## 2017-10-04 PROCEDURE — 71250 CT THORAX DX C-: CPT

## 2017-10-19 ENCOUNTER — OUTPATIENT (OUTPATIENT)
Dept: OUTPATIENT SERVICES | Facility: HOSPITAL | Age: 57
LOS: 1 days | End: 2017-10-19
Payer: MEDICARE

## 2017-10-19 ENCOUNTER — APPOINTMENT (OUTPATIENT)
Dept: THORACIC SURGERY | Facility: CLINIC | Age: 57
End: 2017-10-19
Payer: MEDICARE

## 2017-10-19 VITALS
HEART RATE: 78 BPM | TEMPERATURE: 98.4 F | OXYGEN SATURATION: 96 % | RESPIRATION RATE: 18 BRPM | WEIGHT: 195 LBS | SYSTOLIC BLOOD PRESSURE: 146 MMHG | DIASTOLIC BLOOD PRESSURE: 74 MMHG

## 2017-10-19 DIAGNOSIS — Z98.890 OTHER SPECIFIED POSTPROCEDURAL STATES: Chronic | ICD-10-CM

## 2017-10-19 PROCEDURE — 71020: CPT | Mod: 26

## 2017-10-19 PROCEDURE — 71046 X-RAY EXAM CHEST 2 VIEWS: CPT

## 2017-10-19 PROCEDURE — 99214 OFFICE O/P EST MOD 30 MIN: CPT

## 2018-01-22 ENCOUNTER — EMERGENCY (EMERGENCY)
Facility: HOSPITAL | Age: 58
LOS: 1 days | Discharge: HOME | End: 2018-01-22
Attending: EMERGENCY MEDICINE | Admitting: EMERGENCY MEDICINE
Payer: MEDICARE

## 2018-01-22 VITALS
DIASTOLIC BLOOD PRESSURE: 87 MMHG | WEIGHT: 184.09 LBS | OXYGEN SATURATION: 97 % | HEIGHT: 70 IN | TEMPERATURE: 100 F | HEART RATE: 99 BPM | SYSTOLIC BLOOD PRESSURE: 154 MMHG | RESPIRATION RATE: 20 BRPM

## 2018-01-22 VITALS
TEMPERATURE: 100 F | HEART RATE: 80 BPM | SYSTOLIC BLOOD PRESSURE: 127 MMHG | DIASTOLIC BLOOD PRESSURE: 73 MMHG | RESPIRATION RATE: 18 BRPM | OXYGEN SATURATION: 98 %

## 2018-01-22 DIAGNOSIS — Z88.8 ALLERGY STATUS TO OTHER DRUGS, MEDICAMENTS AND BIOLOGICAL SUBSTANCES STATUS: ICD-10-CM

## 2018-01-22 DIAGNOSIS — R05 COUGH: ICD-10-CM

## 2018-01-22 DIAGNOSIS — Z79.899 OTHER LONG TERM (CURRENT) DRUG THERAPY: ICD-10-CM

## 2018-01-22 DIAGNOSIS — I10 ESSENTIAL (PRIMARY) HYPERTENSION: ICD-10-CM

## 2018-01-22 DIAGNOSIS — Z79.891 LONG TERM (CURRENT) USE OF OPIATE ANALGESIC: ICD-10-CM

## 2018-01-22 DIAGNOSIS — Z87.891 PERSONAL HISTORY OF NICOTINE DEPENDENCE: ICD-10-CM

## 2018-01-22 DIAGNOSIS — R06.2 WHEEZING: ICD-10-CM

## 2018-01-22 DIAGNOSIS — Z98.890 OTHER SPECIFIED POSTPROCEDURAL STATES: Chronic | ICD-10-CM

## 2018-01-22 PROCEDURE — 71046 X-RAY EXAM CHEST 2 VIEWS: CPT | Mod: 26

## 2018-01-22 PROCEDURE — 99284 EMERGENCY DEPT VISIT MOD MDM: CPT | Mod: 25

## 2018-01-22 RX ORDER — ALBUTEROL 90 UG/1
2.5 AEROSOL, METERED ORAL ONCE
Qty: 0 | Refills: 0 | Status: COMPLETED | OUTPATIENT
Start: 2018-01-22 | End: 2018-01-22

## 2018-01-22 RX ORDER — ALBUTEROL 90 UG/1
1 AEROSOL, METERED ORAL ONCE
Qty: 0 | Refills: 0 | Status: COMPLETED | OUTPATIENT
Start: 2018-01-22 | End: 2018-01-22

## 2018-01-22 RX ORDER — AZITHROMYCIN 500 MG/1
1 TABLET, FILM COATED ORAL
Qty: 6 | Refills: 0 | OUTPATIENT
Start: 2018-01-22 | End: 2018-01-26

## 2018-01-22 RX ORDER — ALBUTEROL 90 UG/1
2 AEROSOL, METERED ORAL
Qty: 1 | Refills: 2 | OUTPATIENT
Start: 2018-01-22 | End: 2018-04-21

## 2018-01-22 RX ORDER — ACETAMINOPHEN 500 MG
650 TABLET ORAL ONCE
Qty: 0 | Refills: 0 | Status: COMPLETED | OUTPATIENT
Start: 2018-01-22 | End: 2018-01-22

## 2018-01-22 RX ADMIN — ALBUTEROL 2.5 MILLIGRAM(S): 90 AEROSOL, METERED ORAL at 08:59

## 2018-01-22 RX ADMIN — Medication 650 MILLIGRAM(S): at 12:07

## 2018-01-22 RX ADMIN — ALBUTEROL 1 PUFF(S): 90 AEROSOL, METERED ORAL at 12:07

## 2018-01-22 RX ADMIN — Medication 60 MILLIGRAM(S): at 12:07

## 2018-01-22 NOTE — ED ADULT NURSE NOTE - OBJECTIVE STATEMENT
c/o worsening productive (moderate amount of thick sputum) cough and chest congestion for apx 2 weeks, denies additional symptoms, no improvement s/p z-pack, no s/s of distress, awaiting MD johnson

## 2018-01-22 NOTE — ED ADULT NURSE NOTE - PMH
AIDS    Arthritis    Avascular necrosis of bone of hip, right    Hou's esophagus    Bipolar disorder    Bronchitis, chronic    Crack cocaine use  30 years ago  ETOH abuse  30 years ago  HIV (human immunodeficiency virus infection)    HTN (hypertension)    HTN (hypertension)    Marijuana abuse  30 years ago  Suicidal behavior with attempted self-injury  2003

## 2018-01-22 NOTE — ED PROVIDER NOTE - DIAGNOSTIC INTERPRETATION
Interpreted by ED Physician:  CXR (2 view): no acute abnormality: no infiltrates, bones appear intact, cardiac silhouette wnl

## 2018-01-22 NOTE — ED ADULT TRIAGE NOTE - CHIEF COMPLAINT QUOTE
c/o persistent productive cough and chest congestion [id number],on Z-pack with no improvement. Denies fevers or body aches or CP/SOB. Pt is concerned r/t spontaneous PNTHRX  with chest tube last year.

## 2018-01-22 NOTE — ED PROVIDER NOTE - OBJECTIVE STATEMENT
57 M here with cough x 3 weeks. had a URI and took a Zpack. Cough feels like it has a wheeze. No f/c. Had a PTX in the setting of cough last years and blebectomy last year. No f/c.

## 2018-01-22 NOTE — ED ADULT NURSE NOTE - CHIEF COMPLAINT QUOTE
c/o persistent productive cough and chest congestion [id number],on Z-pack with no improvement. Denies fevers or body aches or CP/SOB. Pt is concerned r/t spontaneous PNTHRX  with chest tube last year.

## 2018-07-30 NOTE — ED PROVIDER NOTE - NS ED MD DISPO DISCHARGE CCDA
Normal rate, regular rhythm.  Heart sounds S1, S2.  No murmurs, rubs or gallops. Patient/Caregiver provided printed discharge information.

## 2018-08-14 ENCOUNTER — EMERGENCY (EMERGENCY)
Facility: HOSPITAL | Age: 58
LOS: 1 days | Discharge: HOME | End: 2018-08-14
Attending: EMERGENCY MEDICINE | Admitting: EMERGENCY MEDICINE
Payer: MEDICARE

## 2018-08-14 VITALS
WEIGHT: 205.03 LBS | DIASTOLIC BLOOD PRESSURE: 105 MMHG | OXYGEN SATURATION: 98 % | RESPIRATION RATE: 17 BRPM | TEMPERATURE: 98 F | SYSTOLIC BLOOD PRESSURE: 179 MMHG | HEART RATE: 88 BPM

## 2018-08-14 VITALS
SYSTOLIC BLOOD PRESSURE: 155 MMHG | DIASTOLIC BLOOD PRESSURE: 80 MMHG | OXYGEN SATURATION: 95 % | HEART RATE: 73 BPM | RESPIRATION RATE: 14 BRPM

## 2018-08-14 DIAGNOSIS — R42 DIZZINESS AND GIDDINESS: ICD-10-CM

## 2018-08-14 DIAGNOSIS — Z79.891 LONG TERM (CURRENT) USE OF OPIATE ANALGESIC: ICD-10-CM

## 2018-08-14 DIAGNOSIS — B20 HUMAN IMMUNODEFICIENCY VIRUS [HIV] DISEASE: ICD-10-CM

## 2018-08-14 DIAGNOSIS — Z79.2 LONG TERM (CURRENT) USE OF ANTIBIOTICS: ICD-10-CM

## 2018-08-14 DIAGNOSIS — Z88.8 ALLERGY STATUS TO OTHER DRUGS, MEDICAMENTS AND BIOLOGICAL SUBSTANCES: ICD-10-CM

## 2018-08-14 DIAGNOSIS — Z79.899 OTHER LONG TERM (CURRENT) DRUG THERAPY: ICD-10-CM

## 2018-08-14 DIAGNOSIS — R20.0 ANESTHESIA OF SKIN: ICD-10-CM

## 2018-08-14 DIAGNOSIS — Z98.890 OTHER SPECIFIED POSTPROCEDURAL STATES: Chronic | ICD-10-CM

## 2018-08-14 DIAGNOSIS — I10 ESSENTIAL (PRIMARY) HYPERTENSION: ICD-10-CM

## 2018-08-14 DIAGNOSIS — R20.2 PARESTHESIA OF SKIN: ICD-10-CM

## 2018-08-14 LAB
ALBUMIN SERPL ELPH-MCNC: 4.2 G/DL — SIGNIFICANT CHANGE UP (ref 3.4–5)
ALP SERPL-CCNC: 62 U/L — SIGNIFICANT CHANGE UP (ref 40–120)
ALT FLD-CCNC: 42 U/L — SIGNIFICANT CHANGE UP (ref 12–42)
ANION GAP SERPL CALC-SCNC: 7 MMOL/L — LOW (ref 9–16)
APTT BLD: 36.1 SEC — SIGNIFICANT CHANGE UP (ref 27.5–36.5)
AST SERPL-CCNC: 33 U/L — SIGNIFICANT CHANGE UP (ref 15–37)
BASOPHILS NFR BLD AUTO: 0.5 % — SIGNIFICANT CHANGE UP (ref 0–2)
BILIRUB SERPL-MCNC: 0.4 MG/DL — SIGNIFICANT CHANGE UP (ref 0.2–1.2)
BUN SERPL-MCNC: 25 MG/DL — HIGH (ref 7–23)
CALCIUM SERPL-MCNC: 9.3 MG/DL — SIGNIFICANT CHANGE UP (ref 8.5–10.5)
CHLORIDE SERPL-SCNC: 100 MMOL/L — SIGNIFICANT CHANGE UP (ref 96–108)
CK MB BLD-MCNC: 1.2 % — SIGNIFICANT CHANGE UP
CK MB CFR SERPL CALC: 4.5 NG/ML — HIGH (ref 0.5–3.6)
CK SERPL-CCNC: 375 U/L — HIGH (ref 39–308)
CO2 SERPL-SCNC: 30 MMOL/L — SIGNIFICANT CHANGE UP (ref 22–31)
CREAT SERPL-MCNC: 1.35 MG/DL — HIGH (ref 0.5–1.3)
EOSINOPHIL NFR BLD AUTO: 1.4 % — SIGNIFICANT CHANGE UP (ref 0–6)
ETHANOL SERPL-MCNC: <3 MG/DL — SIGNIFICANT CHANGE UP
GLUCOSE SERPL-MCNC: 106 MG/DL — HIGH (ref 70–99)
HCT VFR BLD CALC: 45.9 % — SIGNIFICANT CHANGE UP (ref 39–50)
HGB BLD-MCNC: 16 G/DL — SIGNIFICANT CHANGE UP (ref 13–17)
IMM GRANULOCYTES NFR BLD AUTO: 0.5 % — SIGNIFICANT CHANGE UP (ref 0–1.5)
INR BLD: 0.96 — SIGNIFICANT CHANGE UP (ref 0.88–1.16)
LACTATE SERPL-SCNC: 1.2 MMOL/L — SIGNIFICANT CHANGE UP (ref 0.4–2)
LYMPHOCYTES # BLD AUTO: 33.7 % — SIGNIFICANT CHANGE UP (ref 13–44)
MCHC RBC-ENTMCNC: 29.5 PG — SIGNIFICANT CHANGE UP (ref 27–34)
MCHC RBC-ENTMCNC: 34.9 G/DL — SIGNIFICANT CHANGE UP (ref 32–36)
MCV RBC AUTO: 84.5 FL — SIGNIFICANT CHANGE UP (ref 80–100)
MONOCYTES NFR BLD AUTO: 7.7 % — SIGNIFICANT CHANGE UP (ref 2–14)
NEUTROPHILS NFR BLD AUTO: 56.2 % — SIGNIFICANT CHANGE UP (ref 43–77)
PLATELET # BLD AUTO: 291 K/UL — SIGNIFICANT CHANGE UP (ref 150–400)
POTASSIUM SERPL-MCNC: 3.6 MMOL/L — SIGNIFICANT CHANGE UP (ref 3.5–5.3)
POTASSIUM SERPL-SCNC: 3.6 MMOL/L — SIGNIFICANT CHANGE UP (ref 3.5–5.3)
PROT SERPL-MCNC: 7.6 G/DL — SIGNIFICANT CHANGE UP (ref 6.4–8.2)
PROTHROM AB SERPL-ACNC: 10.6 SEC — SIGNIFICANT CHANGE UP (ref 9.8–12.7)
RBC # BLD: 5.43 M/UL — SIGNIFICANT CHANGE UP (ref 4.2–5.8)
RBC # FLD: 11.9 % — SIGNIFICANT CHANGE UP (ref 10.3–16.9)
SODIUM SERPL-SCNC: 137 MMOL/L — SIGNIFICANT CHANGE UP (ref 132–145)
TROPONIN I SERPL-MCNC: <0.017 NG/ML — LOW (ref 0.02–0.06)
WBC # BLD: 11 K/UL — HIGH (ref 3.8–10.5)
WBC # FLD AUTO: 11 K/UL — HIGH (ref 3.8–10.5)

## 2018-08-14 PROCEDURE — 93010 ELECTROCARDIOGRAM REPORT: CPT

## 2018-08-14 PROCEDURE — 71045 X-RAY EXAM CHEST 1 VIEW: CPT | Mod: 26

## 2018-08-14 PROCEDURE — 70450 CT HEAD/BRAIN W/O DYE: CPT | Mod: 26,59

## 2018-08-14 PROCEDURE — 70496 CT ANGIOGRAPHY HEAD: CPT | Mod: 26

## 2018-08-14 PROCEDURE — 99291 CRITICAL CARE FIRST HOUR: CPT

## 2018-08-14 PROCEDURE — G0425: CPT

## 2018-08-14 PROCEDURE — 70498 CT ANGIOGRAPHY NECK: CPT | Mod: 26

## 2018-08-14 NOTE — ED PROVIDER NOTE - OBJECTIVE STATEMENT
57 y/o male with PMHx of HIV (T cells of 1000, undetectable viral loads in the past, currently on a new med called Juluca that has increased his T cell count), HTN (takes Hydrochlorothiazide and Atenolol), high cholesterol, hip replacement, neuropathy (had ulnar nerves relocated in arm, lysis and adhesion) and spontaneous PTX (had pleurectomy performed) presents to ED c/o dizziness. Patient reports he was doing housework around 1.5 hours ago at 3:30PM when he suddenly experienced dizziness with associated right-sided facial tingling, and was unable to completely smile. States that his face now feels numb, with sensation slightly reduced, but denies CP or abd pain. Denies any previous MI or TIA, FS in ED was 108. Patient was in a wooded area 2 weeks ago in [place], but denies any tick bites at that time.

## 2018-08-14 NOTE — ED ADULT NURSE NOTE - CHPI ED NUR SYMPTOMS NEG
no vomiting/no weakness/no loss of consciousness/no nausea/no dizziness/no change in level of consciousness/no confusion/no fever/no numbness/no blurred vision

## 2018-08-14 NOTE — ED PROVIDER NOTE - CRITICAL CARE INDICATION, MLM
patient was critically ill... Patient was critically ill with a high probability of imminent or life threatening deterioration. Patient was critically ill with a high probability of imminent or life threatening deterioration. Stroke code activation

## 2018-08-14 NOTE — ED PROVIDER NOTE - PROGRESS NOTE DETAILS
Radiology called regarding CT head. CT head negative for hemorrhage. Discussed case with Dr. Ahn, informed him of NIH stroke scale. He will call to give further recommendations. no concern for stroke, most likely peripheral, Dr Ahn does not recommend repeat CTAs although limited due to poor technique. Will D/C w results and recommendations for neuro f.u

## 2018-08-14 NOTE — ED PROVIDER NOTE - NEUROLOGICAL, MLM
Alert and oriented. Flattening on the right side of the face with decreased sensation, no decreased strength in any extremity, patient is able to provide history.

## 2018-08-14 NOTE — ED PROVIDER NOTE - PSH
H/O carpal tunnel repair  2017  History of total hip replacement  x 3  No significant past surgical history    Other postprocedural status  S/P laminectomy

## 2018-08-14 NOTE — ED PROVIDER NOTE - MEDICAL DECISION MAKING DETAILS
Stroke code activated, patient had new onset deficit within an hour and 40 minutes from arrival. Deficit is isolated to the right face, may be peripheral facial palsy versus acute stroke. Will do work-up, consult neuro, and continue to assess.

## 2018-08-14 NOTE — STROKE CODE NOTE - NIH STROKE SCALE: 4. FACIAL PALSY, QM
(1) Minor paralysis (flattened nasolabial fold, asymmetry on smiling) (0) Normal symmetrical movements

## 2018-08-14 NOTE — ED ADULT NURSE NOTE - NSIMPLEMENTINTERV_GEN_ALL_ED
Implemented All Fall with Harm Risk Interventions:  [place] to call system. Call bell, personal items and telephone within reach. Instruct patient to call for assistance. Room bathroom lighting operational. Non-slip footwear when patient is off stretcher. Physically safe environment: no spills, clutter or unnecessary equipment. Stretcher in lowest position, wheels locked, appropriate side rails in place. Provide visual cue, wrist band, yellow gown, etc. Monitor gait and stability. Monitor for mental status changes and reorient to person, place, and time. Review medications for side effects contributing to fall risk. Reinforce activity limits and safety measures with patient and family. Provide visual clues: red socks.

## 2018-08-14 NOTE — ED PROVIDER NOTE - PMH
AIDS    Arthritis    Avascular necrosis of bone of hip, right    Hou's esophagus    Bipolar disorder    Bronchitis, chronic    Crack cocaine use  30 years ago  ETOH abuse  30 years ago  High cholesterol    HIV (human immunodeficiency virus infection)    HTN (hypertension)    HTN (hypertension)    Marijuana abuse  30 years ago  Neuropathy    Suicidal behavior with attempted self-injury  2003

## 2018-08-14 NOTE — STROKE CODE NOTE - ASSESSMENT/PLAN
functional neuro exam  CT head neg  CTA poor contrast bolus/study? no need to repeat  f/u w/ neurology outpatient clinic if symptoms persist

## 2018-08-15 PROBLEM — J42 UNSPECIFIED CHRONIC BRONCHITIS: Chronic | Status: ACTIVE | Noted: 2017-06-26

## 2018-08-15 PROBLEM — I10 ESSENTIAL (PRIMARY) HYPERTENSION: Chronic | Status: INACTIVE | Noted: 2017-08-29 | Resolved: 2018-08-14

## 2018-08-15 PROBLEM — B20 HUMAN IMMUNODEFICIENCY VIRUS [HIV] DISEASE: Chronic | Status: INACTIVE | Noted: 2017-08-29 | Resolved: 2018-08-14

## 2018-08-15 PROBLEM — F14.90 COCAINE USE, UNSPECIFIED, UNCOMPLICATED: Chronic | Status: ACTIVE | Noted: 2017-06-26

## 2018-08-15 PROBLEM — T14.91 SUICIDE ATTEMPT: Chronic | Status: ACTIVE | Noted: 2017-06-26

## 2018-08-15 PROBLEM — K22.70 BARRETT'S ESOPHAGUS WITHOUT DYSPLASIA: Chronic | Status: ACTIVE | Noted: 2017-06-26

## 2018-08-15 PROBLEM — F12.10 CANNABIS ABUSE, UNCOMPLICATED: Chronic | Status: ACTIVE | Noted: 2017-06-26

## 2018-08-15 PROBLEM — F10.10 ALCOHOL ABUSE, UNCOMPLICATED: Chronic | Status: ACTIVE | Noted: 2017-06-26

## 2018-08-15 PROBLEM — M19.90 UNSPECIFIED OSTEOARTHRITIS, UNSPECIFIED SITE: Chronic | Status: ACTIVE | Noted: 2017-06-26

## 2019-03-07 ENCOUNTER — EMERGENCY (EMERGENCY)
Facility: HOSPITAL | Age: 59
LOS: 1 days | Discharge: HOME | End: 2019-03-07
Attending: EMERGENCY MEDICINE | Admitting: EMERGENCY MEDICINE
Payer: MEDICARE

## 2019-03-07 VITALS
RESPIRATION RATE: 18 BRPM | SYSTOLIC BLOOD PRESSURE: 133 MMHG | OXYGEN SATURATION: 97 % | WEIGHT: 190.04 LBS | DIASTOLIC BLOOD PRESSURE: 65 MMHG | TEMPERATURE: 98 F | HEART RATE: 75 BPM

## 2019-03-07 DIAGNOSIS — Z79.52 LONG TERM (CURRENT) USE OF SYSTEMIC STEROIDS: ICD-10-CM

## 2019-03-07 DIAGNOSIS — J20.9 ACUTE BRONCHITIS, UNSPECIFIED: ICD-10-CM

## 2019-03-07 DIAGNOSIS — Z79.899 OTHER LONG TERM (CURRENT) DRUG THERAPY: ICD-10-CM

## 2019-03-07 DIAGNOSIS — B20 HUMAN IMMUNODEFICIENCY VIRUS [HIV] DISEASE: ICD-10-CM

## 2019-03-07 DIAGNOSIS — Z88.8 ALLERGY STATUS TO OTHER DRUGS, MEDICAMENTS AND BIOLOGICAL SUBSTANCES STATUS: ICD-10-CM

## 2019-03-07 DIAGNOSIS — E78.00 PURE HYPERCHOLESTEROLEMIA, UNSPECIFIED: ICD-10-CM

## 2019-03-07 DIAGNOSIS — Z98.890 OTHER SPECIFIED POSTPROCEDURAL STATES: Chronic | ICD-10-CM

## 2019-03-07 DIAGNOSIS — I10 ESSENTIAL (PRIMARY) HYPERTENSION: ICD-10-CM

## 2019-03-07 DIAGNOSIS — Z79.891 LONG TERM (CURRENT) USE OF OPIATE ANALGESIC: ICD-10-CM

## 2019-03-07 DIAGNOSIS — R05 COUGH: ICD-10-CM

## 2019-03-07 DIAGNOSIS — Z79.2 LONG TERM (CURRENT) USE OF ANTIBIOTICS: ICD-10-CM

## 2019-03-07 PROBLEM — G62.9 POLYNEUROPATHY, UNSPECIFIED: Chronic | Status: ACTIVE | Noted: 2018-08-14

## 2019-03-07 PROCEDURE — 99283 EMERGENCY DEPT VISIT LOW MDM: CPT

## 2019-03-07 RX ORDER — AZITHROMYCIN 500 MG/1
1 TABLET, FILM COATED ORAL
Qty: 6 | Refills: 0
Start: 2019-03-07 | End: 2019-08-06

## 2019-03-07 RX ORDER — AZITHROMYCIN 500 MG/1
1 TABLET, FILM COATED ORAL
Qty: 6 | Refills: 0
Start: 2019-03-07 | End: 2019-03-11

## 2019-03-07 NOTE — ED PROVIDER NOTE - NSFOLLOWUPCLINICS_GEN_ALL_ED_FT
[facility]  HIV/AIDS Research & Treatment  [address] [place] [address]  Phone: [phone]  Fax:   Follow Up Time:

## 2019-03-07 NOTE — ED ADULT TRIAGE NOTE - CHIEF COMPLAINT QUOTE
Pt presents to ED dry cough with mild yellow phlegm, denies any fever/chills, no N/V/D, no SOB or difficulty breathing. Pt reports hx of chronic bronchitis.

## 2019-03-07 NOTE — ED PROVIDER NOTE - CLINICAL SUMMARY MEDICAL DECISION MAKING FREE TEXT BOX
59 y.o M with PMHx HIV with 980 T cell count, HTN, bronchitis, presents to the ED with c/o productive cough with some yellow mucus since today. Pt states he has a hx of Bronchitis every year, so he wanted to start tx before sx become worse. Denies fever, CP, chills, N/V, SOB. Declined CXR at this time.  Follow up with Dr Ceja HIV primary care

## 2019-03-07 NOTE — ED PROVIDER NOTE - OBJECTIVE STATEMENT
59 y.o M with PMHx HIV with 980 T cell count, HTN, bronchitis, presents to the ED with c/o productive cough with some yellow mucus since today. Pt states he has a hx of Bronchitis every year, so he wanted to start tx before sx become worse. Denies fever, CP, chills, N/V, SOB. Declined CXR at this time.

## 2019-03-07 NOTE — ED PROVIDER NOTE - NSFOLLOWUPINSTRUCTIONS_ED_ALL_ED_FT
Please follow up with Dr Ceja, [facility] call for [facility].  Take azithromycin as prescribed.

## 2019-03-20 ENCOUNTER — EMERGENCY (EMERGENCY)
Facility: HOSPITAL | Age: 59
LOS: 1 days | Discharge: HOME | End: 2019-03-20
Attending: EMERGENCY MEDICINE | Admitting: EMERGENCY MEDICINE
Payer: MEDICARE

## 2019-03-20 VITALS
HEART RATE: 68 BPM | WEIGHT: 195.11 LBS | DIASTOLIC BLOOD PRESSURE: 80 MMHG | OXYGEN SATURATION: 97 % | RESPIRATION RATE: 16 BRPM | TEMPERATURE: 98 F | SYSTOLIC BLOOD PRESSURE: 138 MMHG

## 2019-03-20 DIAGNOSIS — Z98.890 OTHER SPECIFIED POSTPROCEDURAL STATES: Chronic | ICD-10-CM

## 2019-03-20 LAB
FLU A RESULT: SIGNIFICANT CHANGE UP
FLU A RESULT: SIGNIFICANT CHANGE UP
FLUAV AG NPH QL: SIGNIFICANT CHANGE UP
FLUBV AG NPH QL: SIGNIFICANT CHANGE UP
RSV RESULT: SIGNIFICANT CHANGE UP
RSV RNA RESP QL NAA+PROBE: SIGNIFICANT CHANGE UP

## 2019-03-20 PROCEDURE — 99283 EMERGENCY DEPT VISIT LOW MDM: CPT | Mod: 25

## 2019-03-20 PROCEDURE — 71046 X-RAY EXAM CHEST 2 VIEWS: CPT | Mod: 26

## 2019-03-20 RX ORDER — DEXAMETHASONE 0.5 MG/5ML
10 ELIXIR ORAL ONCE
Qty: 0 | Refills: 0 | Status: COMPLETED | OUTPATIENT
Start: 2019-03-20 | End: 2019-03-20

## 2019-03-20 RX ADMIN — Medication 10 MILLIGRAM(S): at 11:37

## 2019-03-20 NOTE — ED PROVIDER NOTE - OBJECTIVE STATEMENT
58 y/o male with PMHx of HIV (980 T-cell count), HTN, Hou's esophagus, chronic bronchitis, presents to the ED with c/o cough. Pt reports he was seen in this ED on 3/7 for bronchitis and was started on a Z-Blas which improved his symptoms. He notes feeling fatigued and a productive cough x6 days ago prompting him to present to the ED today for another evaluation. Pt notes taking Tylenol last night for his symptoms. He admits to chest congestion, wheezing, and current cough. Pt denies CP, no fever, no loss in appetite. He is a former smoker, quit 17 years ago. To note, pt has an appt with his PCP on Tuesday.

## 2019-03-20 NOTE — ED PROVIDER NOTE - DIAGNOSTIC INTERPRETATION
Interpreted by Dr. Collins   chest x-ray, 2 views  Lungs clear, heart shadow normal, bony structures normal, no free air under diaphragm, no PTX

## 2019-03-20 NOTE — ED PROVIDER NOTE - CLINICAL SUMMARY MEDICAL DECISION MAKING FREE TEXT BOX
cough w/out fever, increased work of breathing, or acute findings on CXR. Negative influenza swab, HDS and saturating well on RA. Decadron + azithromycin, f/u with PMD this week. Given return precautions.

## 2019-03-20 NOTE — ED ADULT NURSE NOTE - CHPI ED NUR SYMPTOMS NEG
no decreased eating/drinking/no abdominal pain/no vomiting/no shortness of breath/no headache/no diarrhea/no rash

## 2019-03-20 NOTE — ED PROVIDER NOTE - PHYSICAL EXAMINATION
VITAL SIGNS: I have reviewed nursing notes and confirm.  CONSTITUTIONAL: Well-developed; well-nourished; in no acute distress.  SKIN: Skin is warm and dry,   HEAD: Normocephalic; atraumatic.  EYES: PERRL, EOM intact;   ENT: No nasal discharge; airway clear.   NECK: Supple; non tender.  CARD: S1, S2 normal; no murmurs, gallops, or rubs. Regular rate and rhythm.  RESP: No wheezes, rales or rhonchi.  MSK: Normal ROM. No clubbing, cyanosis or edema.  NEURO: Alert, oriented. Grossly unremarkable.  PSYCH: Cooperative, appropriate. VITAL SIGNS: I have reviewed nursing notes and confirm.  CONSTITUTIONAL: Well-developed; well-nourished; in no acute distress.  SKIN: Skin is warm and dry,   HEAD: Normocephalic; atraumatic.  EYES: PERRL, EOM intact;   ENT: No nasal discharge; airway clear.   NECK: Supple; non tender.  CARD: S1, S2 normal; no murmurs, gallops, or rubs. Regular rate and rhythm.  RESP: No wheezes, rales or rhonchi.  NEURO: Alert, oriented. Grossly unremarkable.  PSYCH: Cooperative, appropriate.

## 2019-03-20 NOTE — ED PROVIDER NOTE - NSFOLLOWUPINSTRUCTIONS_ED_ALL_ED_FT
Log Out.    [facility] [employer]®     :  Health system             ACUTE BRONCHITIS - AfterCare(R) Instructions(ER/ED)     Acute Bronchitis    WHAT YOU NEED TO KNOW:    Acute bronchitis is swelling and irritation in the air passages of your lungs. This irritation may cause you to cough or have other breathing problems. Acute bronchitis often starts because of another illness, such as a cold or the flu. The illness spreads from your nose and throat to your windpipe and airways. Bronchitis is often called a chest cold. Acute bronchitis lasts about 3 to 6 weeks and is usually not a serious illness. Your cough can last for several weeks.     DISCHARGE INSTRUCTIONS:    Return to the emergency department if:     You cough up blood.      Your lips or fingernails turn blue.      You feel like you are not getting enough air when you breathe.    Contact your healthcare provider if:     You have a fever.      Your breathing problems do not go away or get worse.      Your cough does not get better within 4 weeks.      You have questions or concerns about your condition or care.    Self-care:     Get more rest. Rest helps your body to heal. Slowly start to do more each day. Rest when you feel it is needed.      Avoid irritants in the air. Avoid chemicals, fumes, and dust. Wear a face mask if you must work around dust or fumes. Stay inside on days when air pollution levels are high. If you have allergies, stay inside when pollen counts are high. Do not use aerosol products, such as spray-on deodorant, bug spray, and hair spray.      Do not smoke or be around others who smoke. Nicotine and other chemicals in cigarettes and cigars damages the cilia that move mucus out of your lungs. Ask your healthcare provider for information if you currently smoke and need help to quit. E-cigarettes or smokeless tobacco still contain nicotine. Talk to your healthcare provider before you use these products.       Drink liquids as directed. Liquids help keep your air passages moist and help you cough up mucus. You may need to drink more liquids when you have acute bronchitis. Ask how much liquid to drink each day and which liquids are best for you.      Use a humidifier or vaporizer. Use a cool mist humidifier or a vaporizer to increase air moisture in your home. This may make it easier for you to breathe and help decrease your cough.     Decrease risk for acute bronchitis:     Get the vaccinations you need. Ask your healthcare provider if you should get vaccinated against the flu or pneumonia.      Prevent the spread of germs. You can decrease your risk of acute bronchitis and other illnesses by doing the following:   Wash your hands often with soap and water. Carry germ-killing hand lotion or gel with you. You can use the lotion or gel to clean your hands when soap and water are not available.      Do not touch your eyes, nose, or mouth unless you have washed your hands first.      Always cover your mouth when you cough to prevent the spread of germs. It is best to cough into a tissue or your shirt sleeve instead of into your hand. Ask those around you cover their mouths when they cough.      Try to avoid people who have a cold or the flu. If you are sick, stay away from others as much as possible.    Medicines: Your healthcare provider may give you any of the following:     Ibuprofen or acetaminophen are medicines that help lower your fever. They are available without a doctor's order. Ask your healthcare provider which medicine is right for you. Ask how much to take and how often to take it. Follow directions. These medicines can cause stomach bleeding if not taken correctly. Ibuprofen can cause kidney damage. Do not take ibuprofen if you have kidney disease, an ulcer, or allergies to aspirin. Acetaminophen can cause liver damage. Do not take more than 4,000 milligrams in 24 hours.       Decongestants help loosen mucus in your lungs and make it easier to cough up. This can help you breathe easier.      Cough suppressants decrease your urge to cough. If your cough produces mucus, do not take a cough suppressant unless your healthcare provider tells you to. Your healthcare provider may suggest that you take a cough suppressant at night so you can rest.      Inhalers may be given. Your healthcare provider may give you one or more inhalers to help you breathe easier and cough less. An inhaler gives your medicine to open your airways. Ask your healthcare provider to show you how to use your inhaler correctly.Metered Dose Inhaler           Take your medicine as directed. Contact your healthcare provider if you think your medicine is not helping or if you have side effects. Tell him of her if you are allergic to any medicine. Keep a list of the medicines, vitamins, and herbs you take. Include the amounts, and when and why you take them. Bring the list or the pill bottles to follow-up visits. Carry your medicine list with you in case of an emergency.    Follow up with your healthcare provider as directed: Write down questions you have so you will remember to ask them during your follow-up visits.

## 2019-03-22 ENCOUNTER — APPOINTMENT (OUTPATIENT)
Dept: INTERNAL MEDICINE | Facility: CLINIC | Age: 59
End: 2019-03-22

## 2019-03-24 DIAGNOSIS — E78.00 PURE HYPERCHOLESTEROLEMIA, UNSPECIFIED: ICD-10-CM

## 2019-03-24 DIAGNOSIS — B20 HUMAN IMMUNODEFICIENCY VIRUS [HIV] DISEASE: ICD-10-CM

## 2019-03-24 DIAGNOSIS — Z79.899 OTHER LONG TERM (CURRENT) DRUG THERAPY: ICD-10-CM

## 2019-03-24 DIAGNOSIS — Z79.2 LONG TERM (CURRENT) USE OF ANTIBIOTICS: ICD-10-CM

## 2019-03-24 DIAGNOSIS — Z79.52 LONG TERM (CURRENT) USE OF SYSTEMIC STEROIDS: ICD-10-CM

## 2019-03-24 DIAGNOSIS — R05 COUGH: ICD-10-CM

## 2019-03-24 DIAGNOSIS — J40 BRONCHITIS, NOT SPECIFIED AS ACUTE OR CHRONIC: ICD-10-CM

## 2019-03-24 DIAGNOSIS — Z88.8 ALLERGY STATUS TO OTHER DRUGS, MEDICAMENTS AND BIOLOGICAL SUBSTANCES: ICD-10-CM

## 2019-03-24 DIAGNOSIS — Z87.891 PERSONAL HISTORY OF NICOTINE DEPENDENCE: ICD-10-CM

## 2019-03-24 DIAGNOSIS — I10 ESSENTIAL (PRIMARY) HYPERTENSION: ICD-10-CM

## 2019-07-29 ENCOUNTER — EMERGENCY (EMERGENCY)
Facility: HOSPITAL | Age: 59
LOS: 1 days | Discharge: HOME | End: 2019-07-29
Admitting: EMERGENCY MEDICINE
Payer: MEDICARE

## 2019-07-29 VITALS
RESPIRATION RATE: 16 BRPM | HEART RATE: 70 BPM | TEMPERATURE: 99 F | DIASTOLIC BLOOD PRESSURE: 50 MMHG | SYSTOLIC BLOOD PRESSURE: 130 MMHG | WEIGHT: 195.11 LBS | OXYGEN SATURATION: 96 %

## 2019-07-29 DIAGNOSIS — Z98.890 OTHER SPECIFIED POSTPROCEDURAL STATES: Chronic | ICD-10-CM

## 2019-07-29 PROCEDURE — 71046 X-RAY EXAM CHEST 2 VIEWS: CPT | Mod: 26

## 2019-07-29 PROCEDURE — 99283 EMERGENCY DEPT VISIT LOW MDM: CPT

## 2019-07-29 RX ORDER — AZITHROMYCIN 500 MG/1
500 TABLET, FILM COATED ORAL ONCE
Refills: 0 | Status: COMPLETED | OUTPATIENT
Start: 2019-07-29 | End: 2019-07-29

## 2019-07-29 RX ORDER — AZITHROMYCIN 500 MG/1
1 TABLET, FILM COATED ORAL
Qty: 4 | Refills: 0
Start: 2019-07-29 | End: 2019-08-01

## 2019-07-29 RX ADMIN — AZITHROMYCIN 500 MILLIGRAM(S): 500 TABLET, FILM COATED ORAL at 16:38

## 2019-07-29 NOTE — ED PROVIDER NOTE - OBJECTIVE STATEMENT
60 y/o M HIV, HTN, astudillo's esophagus, bipolar d/o presents to ED c/o productive cough with yellow sputum similar to past episodes of bronchitis.  Pt states 60 y/o M HIV, HTN, astudillo's esophagus, bipolar d/o presents to ED c/o productive cough with yellow sputum x 2 days with associated malaise and weakness similar to past episodes of bronchitis.  Pt denies fevers, chest pain, SOB, KLINE, abd pain, n/v/d.

## 2019-07-29 NOTE — ED PROVIDER NOTE - PMH
AIDS    Arthritis    Avascular necrosis of bone of hip, right    Hou's esophagus    Bipolar disorder    Bronchitis, chronic    Crack cocaine use  30 years ago  ETOH abuse  30 years ago  High cholesterol    HIV (human immunodeficiency virus infection)    HTN (hypertension)    HTN (hypertension)    Marijuana abuse  30 years ago  Neuropathy    Pneumothorax    Suicidal behavior with attempted self-injury  2003

## 2019-07-29 NOTE — ED PROVIDER NOTE - CLINICAL SUMMARY MEDICAL DECISION MAKING FREE TEXT BOX
60 y/o M presents to ED c/o cough.  Pt well appearing, VSS, no SOB, LSCTA.  CXR wet read nl.  Will treat with zpak.  Pt advised to f/u with PCP.  Return precautions advised.

## 2019-07-29 NOTE — ED PROVIDER NOTE - NSFOLLOWUPINSTRUCTIONS_ED_ALL_ED_FT
Hydrate well.    Take azithromycin as prescribed.    Follow up with primary care provider.    Return for fever, shortness of breath, chest pain or other concerns.

## 2019-08-02 ENCOUNTER — EMERGENCY (EMERGENCY)
Facility: HOSPITAL | Age: 59
LOS: 1 days | Discharge: HOME | End: 2019-08-02
Attending: EMERGENCY MEDICINE | Admitting: EMERGENCY MEDICINE
Payer: MEDICARE

## 2019-08-02 VITALS
RESPIRATION RATE: 18 BRPM | OXYGEN SATURATION: 98 % | DIASTOLIC BLOOD PRESSURE: 101 MMHG | TEMPERATURE: 98 F | SYSTOLIC BLOOD PRESSURE: 153 MMHG | WEIGHT: 179.9 LBS | HEIGHT: 70 IN | HEART RATE: 80 BPM

## 2019-08-02 DIAGNOSIS — Z98.890 OTHER SPECIFIED POSTPROCEDURAL STATES: Chronic | ICD-10-CM

## 2019-08-02 DIAGNOSIS — J40 BRONCHITIS, NOT SPECIFIED AS ACUTE OR CHRONIC: ICD-10-CM

## 2019-08-02 DIAGNOSIS — R05 COUGH: ICD-10-CM

## 2019-08-02 PROBLEM — J93.9 PNEUMOTHORAX, UNSPECIFIED: Chronic | Status: ACTIVE | Noted: 2019-07-29

## 2019-08-02 PROCEDURE — 99283 EMERGENCY DEPT VISIT LOW MDM: CPT

## 2019-08-02 NOTE — ED PROVIDER NOTE - CLINICAL SUMMARY MEDICAL DECISION MAKING FREE TEXT BOX
requesting a second z omar because he usually needs 10 days for his chronic bronchitis, declines xray at this time

## 2019-08-02 NOTE — ED PROVIDER NOTE - OBJECTIVE STATEMENT
58 y/o Male with PMHx of chronic bronchitis presents to ED c/o URI symptoms. Pt is requesting another z pack because he usually takes 2 to help his chronic bronchitis. +Cough and phlegm. Denies fever and chills.

## 2019-08-02 NOTE — ED ADULT TRIAGE NOTE - CHIEF COMPLAINT QUOTE
pt was seen here for URI was given z pack finished yesterday. HIV+ and states usually needs an additional treatment for infection  hx of pneumothorax

## 2019-08-07 DIAGNOSIS — Z79.899 OTHER LONG TERM (CURRENT) DRUG THERAPY: ICD-10-CM

## 2019-08-07 DIAGNOSIS — I10 ESSENTIAL (PRIMARY) HYPERTENSION: ICD-10-CM

## 2019-08-07 DIAGNOSIS — J42 UNSPECIFIED CHRONIC BRONCHITIS: ICD-10-CM

## 2019-08-07 DIAGNOSIS — R05 COUGH: ICD-10-CM

## 2019-09-03 ENCOUNTER — EMERGENCY (EMERGENCY)
Facility: HOSPITAL | Age: 59
LOS: 1 days | Discharge: HOME | End: 2019-09-03
Admitting: EMERGENCY MEDICINE
Payer: MEDICARE

## 2019-09-03 VITALS
SYSTOLIC BLOOD PRESSURE: 138 MMHG | DIASTOLIC BLOOD PRESSURE: 75 MMHG | HEART RATE: 65 BPM | TEMPERATURE: 99 F | OXYGEN SATURATION: 98 % | RESPIRATION RATE: 18 BRPM

## 2019-09-03 DIAGNOSIS — Z98.890 OTHER SPECIFIED POSTPROCEDURAL STATES: Chronic | ICD-10-CM

## 2019-09-03 PROCEDURE — 93010 ELECTROCARDIOGRAM REPORT: CPT

## 2019-09-03 PROCEDURE — 99284 EMERGENCY DEPT VISIT MOD MDM: CPT

## 2019-09-03 RX ADMIN — Medication 1 TABLET(S): at 21:12

## 2019-09-03 NOTE — ED PROVIDER NOTE - NSFOLLOWUPINSTRUCTIONS_ED_ALL_ED_FT
TAKE ANTIBIOTICS AS PRESCRIBED.     FOLLOW UP WITH YOUR DENTIST TOMORROW.     FOLLOW UP WITH YOUR PMD IN 2-3 DAYS. TAKE COPY OF EKG WITH YOU.     RETURN TO ER FOR ANY NEW OR CONCERNING SYMPTOMS.

## 2019-09-03 NOTE — ED PROVIDER NOTE - PATIENT PORTAL LINK FT
You can access the FollowMyHealth Patient Portal offered by [facility] by registering at the following website: [email or web address]. By joining [facility]s FollowMyHealth portal, you will also be able to view your health information using other applications (apps) compatible with our system.

## 2019-09-03 NOTE — ED PROVIDER NOTE - CLINICAL SUMMARY MEDICAL DECISION MAKING FREE TEXT BOX
pt presents with gum infection and with rx for pre-op EKG. ekg baseline. augmentin rx sent. will d/c.

## 2019-09-03 NOTE — ED ADULT TRIAGE NOTE - CHIEF COMPLAINT QUOTE
Pt. c/o infection in his gum requesting antibiotics states he has a dentist appointment in the morning. Pt. also requesting an EKG for pre surgical testing.

## 2019-09-03 NOTE — ED ADULT NURSE NOTE - NSIMPLEMENTINTERV_GEN_ALL_ED
Implemented All Universal Safety Interventions:  [place] to call system. Call bell, personal items and telephone within reach. Instruct patient to call for assistance. Room bathroom lighting operational. Non-slip footwear when patient is off stretcher. Physically safe environment: no spills, clutter or unnecessary equipment. Stretcher in lowest position, wheels locked, appropriate side rails in place.

## 2019-09-03 NOTE — ED PROVIDER NOTE - ENMT, MLM
Airway patent, Nasal mucosa clear. Throat has no vesicles, no oropharyngeal exudates and uvula is midline. gum erosion and receding gums throughout, with erythema and increased erosion on the right upper gums, no abscess, generally poor dentition

## 2019-09-03 NOTE — ED PROVIDER NOTE - OBJECTIVE STATEMENT
60yo M with h/o GERD, barretts esophagous, HIV (VL undetectable), presents c/o gum infection. pt has a dentist appointment in the morning and has chronically receding gums but he is scheduled for a hip replacement in 8 days and wants to take abx for his gum infection ASAP to avoid problems with his hip replacement. pt also [facility] for an "EKG without interpretation" because they do not have an EKG machine but they have done the rest of his preop eval. pt has no cardiopulmonary complaints at this time. 58yo M with h/o GERD, barretts esophagous, AIDS (VL undetectable), presents c/o gum infection. pt has a dentist appointment in the morning and has chronically receding gums but he is scheduled for a hip replacement in 8 days and wants to take abx for his gum infection ASAP to avoid problems with his hip replacement. pt also has rx from his PCP [facility] the [facility] for an "EKG without interpretation" because they do not have an EKG machine but they have done the rest of his preop eval. pt has no cardiopulmonary complaints at this time.

## 2019-09-07 DIAGNOSIS — K13.79 OTHER LESIONS OF ORAL MUCOSA: ICD-10-CM

## 2019-09-07 DIAGNOSIS — K05.00 ACUTE GINGIVITIS, PLAQUE INDUCED: ICD-10-CM

## 2019-09-25 ENCOUNTER — EMERGENCY (EMERGENCY)
Facility: HOSPITAL | Age: 59
LOS: 1 days | Discharge: HOME | End: 2019-09-25
Admitting: EMERGENCY MEDICINE
Payer: MEDICARE

## 2019-09-25 VITALS
DIASTOLIC BLOOD PRESSURE: 89 MMHG | OXYGEN SATURATION: 94 % | TEMPERATURE: 98 F | SYSTOLIC BLOOD PRESSURE: 157 MMHG | HEART RATE: 98 BPM | RESPIRATION RATE: 16 BRPM

## 2019-09-25 DIAGNOSIS — Z98.890 OTHER SPECIFIED POSTPROCEDURAL STATES: Chronic | ICD-10-CM

## 2019-09-25 PROCEDURE — 99282 EMERGENCY DEPT VISIT SF MDM: CPT

## 2019-09-25 NOTE — ED PROVIDER NOTE - CLINICAL SUMMARY MEDICAL DECISION MAKING FREE TEXT BOX
AFVSS at time of d/c, pt non-toxic appearing, results, ddx, and f/u plans discussed with pt at bedside, d/c'd home to f/u with PMD, strict return precautions discussed, prompt return to ER for any worsening or new sx, pt verbalized understanding. pt p/w R sided ear fullness sensation and now a small palpable lump to the R anterior neck/periauricular region, no s/s of infection or fluctuance on exam, no focal neuro deficits and pt well appearing, AFVSS at time of d/c, pt non-toxic appearing, results, ddx, and f/u plans discussed with pt at bedside, d/c'd home to f/u with PMD and ENT, sx may be 2/2 allergies and encouraged a course of antihistamine with flonase spray and NSAIDs, strict return precautions discussed, prompt return to ER for any worsening or new sx, pt verbalized understanding.

## 2019-09-25 NOTE — ED PROVIDER NOTE - CARE PROVIDER_API CALL
Mac Valle)  Otolaryngology  [address] [facility] [place], [address] [place] [address]  Phone: [phone]  Fax: [phone]  Follow Up Time:     your PMD,   Phone: (   )    -  Fax: (   )    -  Follow Up Time:

## 2019-09-25 NOTE — ED PROVIDER NOTE - OBJECTIVE STATEMENT
60 yo M with PMHx of HIV/AIDS, last CD4/VL "well controlled and on HAART," AVN of b/l hips, astudillo's esophagus, HTN, HLD, L sided PTX s/p pleurodesis and lobectomy, arthritis, bipolar, neuropathy, polysubstance abuse in the past currently in remission, presenting c/o R sided earache and lump x 2-3 days.  Pt reports having mild nasal congestion and fullness sensation in the R ear x few days.  Now with palpable lump to the R anterior neck/ear region with minimal tenderness. Has appt with his PMD this wk but was concerned and came in for further evaluation. Took benadryl PTA to the ED for allergies with mild improvement.  Denies trauma, tinnitus, change in hearing/gait/balance, dysequilibrium, HA, dizziness, fever, chills, FB sensation, cough, sore throat, d/c, bleeding, pruritus, N/V, SOB, CP, palpitations, focal weakness, and malaise. Pt has appt with his HIV specialist later this week and just had a "deep gum cleaning" by his oral surgeon 1 wk ago and finished a course of augmentin 4d ago. Pt reports onset of pain and sx after finished his course of augmentin.

## 2019-09-25 NOTE — ED ADULT TRIAGE NOTE - CHIEF COMPLAINT QUOTE
pt complains of neck pain/swollen lymph node. states he recently had a 'deep cleaning', was on augmentin for 'gum infection.'

## 2019-09-25 NOTE — ED PROVIDER NOTE - CARE PLAN
Principal Discharge DX:	Lymphadenopathy Principal Discharge DX:	Lymphadenopathy  Secondary Diagnosis:	Seasonal allergies

## 2019-09-25 NOTE — ED PROVIDER NOTE - PHYSICAL EXAMINATION
Vital Signs - nursing notes reviewed and confirmed  Gen - WDWN M, NAD, comfortable and non-toxic appearing, speaking in full sentences   Skin - warm, dry, intact  HEENT - AT/NC, PERRL, EOMI, no conjunctival injection, moist oral mucosa, TM intact b/l with good cone of lights, o/p clear with no erythema, edema, or exudate, uvula midline, airway patent, neck supple and NT, FROM  CV - S1S2, R/R/R  Resp - respiration non-labored, CTAB, symmetric bs b/l, no r/r/w  GI - NABS, soft, ND, NT, no rebound or guarding, no CVAT b/l   MS - w/w/p, no c/c/e, calves supple and NT, distal pulses symmetric b/l, brisk cap refills, +SILT  Neuro - AxOx3, no focal neuro deficits, ambulatory without gait disturbance Vital Signs - nursing notes reviewed and confirmed  Gen - WDWN M, NAD, comfortable and non-toxic appearing, speaking in full sentences   Skin - warm, dry, intact  HEENT - AT/NC, PERRL, EOMI, no conjunctival injection, moist oral mucosa, TM intact b/l with good cone of lights, no rash or d/c, no fluids noted, o/p clear with no erythema, edema, or exudate, uvula midline, airway patent, neck supple and NT, FROM, small palpable mobile non fluctuant node to the periauricular region on the R, no warmth, streaking, crepitus or rash noted, mildly tender to palpation   CV - S1S2, R/R/R  Resp - respiration non-labored, CTAB, symmetric bs b/l, no r/r/w  GI - NABS, soft, ND, NT, no rebound or guarding, no CVAT b/l   MS - w/w/p, no c/c/e, calves supple and NT, distal pulses symmetric b/l, brisk cap refills, +SILT  Neuro - AxOx3, no focal neuro deficits, ambulatory without gait disturbance

## 2019-09-25 NOTE — ED PROVIDER NOTE - CARE PROVIDERS DIRECT ADDRESSES
[email or web address]

## 2019-09-25 NOTE — ED PROVIDER NOTE - PATIENT PORTAL LINK FT
You can access the FollowMyHealth Patient Portal offered by [facility] by registering at the following website: [email or web address]. By joining [employer]’s FollowMyHealth portal, you will also be able to view your health information using other applications (apps) compatible with our system.

## 2019-10-01 DIAGNOSIS — Z79.899 OTHER LONG TERM (CURRENT) DRUG THERAPY: ICD-10-CM

## 2019-10-01 DIAGNOSIS — J30.2 OTHER SEASONAL ALLERGIC RHINITIS: ICD-10-CM

## 2019-10-01 DIAGNOSIS — R59.1 GENERALIZED ENLARGED LYMPH NODES: ICD-10-CM

## 2019-10-01 DIAGNOSIS — I10 ESSENTIAL (PRIMARY) HYPERTENSION: ICD-10-CM

## 2019-10-01 DIAGNOSIS — M54.2 CERVICALGIA: ICD-10-CM

## 2019-10-28 ENCOUNTER — EMERGENCY (EMERGENCY)
Facility: HOSPITAL | Age: 59
LOS: 1 days | Discharge: HOME | End: 2019-10-28
Attending: EMERGENCY MEDICINE | Admitting: EMERGENCY MEDICINE
Payer: MEDICARE

## 2019-10-28 VITALS
HEIGHT: 69 IN | RESPIRATION RATE: 16 BRPM | SYSTOLIC BLOOD PRESSURE: 118 MMHG | TEMPERATURE: 98 F | HEART RATE: 59 BPM | WEIGHT: 195.11 LBS | OXYGEN SATURATION: 97 % | DIASTOLIC BLOOD PRESSURE: 75 MMHG

## 2019-10-28 DIAGNOSIS — M79.672 PAIN IN LEFT FOOT: ICD-10-CM

## 2019-10-28 DIAGNOSIS — M10.072 IDIOPATHIC GOUT, LEFT ANKLE AND FOOT: ICD-10-CM

## 2019-10-28 DIAGNOSIS — Z98.890 OTHER SPECIFIED POSTPROCEDURAL STATES: Chronic | ICD-10-CM

## 2019-10-28 PROCEDURE — 99283 EMERGENCY DEPT VISIT LOW MDM: CPT

## 2019-10-28 PROCEDURE — 73630 X-RAY EXAM OF FOOT: CPT | Mod: 26,LT

## 2019-10-28 RX ORDER — ACETAMINOPHEN 500 MG
975 TABLET ORAL ONCE
Refills: 0 | Status: COMPLETED | OUTPATIENT
Start: 2019-10-28 | End: 2019-10-28

## 2019-10-28 RX ADMIN — Medication 50 MILLIGRAM(S): at 18:43

## 2019-10-28 NOTE — ED PROVIDER NOTE - PATIENT PORTAL LINK FT
You can access the FollowMyHealth Patient Portal offered by [facility] by registering at the following website: [email or web address]. By joining [employer]’s FollowMyHealth portal, you will also be able to view your health information using other applications (apps) compatible with our system.

## 2019-10-28 NOTE — ED PROVIDER NOTE - CLINICAL SUMMARY MEDICAL DECISION MAKING FREE TEXT BOX
sx consistent with gouty arthritis. unlikely to be cellulitis as he completed a round of abx for supposed ear infection yesterday and pt has not hx of LE cellulitis. Pt states he cannot take NSAIDs 2/2 HIV medications, will d/c home on prednisone and tylenol. f/u with podiatry. Given return precautions and anticipatory guidance.

## 2019-10-28 NOTE — ED PROVIDER NOTE - CARE PROVIDER_API CALL
Chapincito Lewis (JOSE CRUZM)  Podiatric Medicine and Surgery  [address], [address]  [place] [address]  Phone: [phone]  Fax: [phone]  Follow Up Time:

## 2019-10-28 NOTE — ED PROVIDER NOTE - OBJECTIVE STATEMENT
58 y/o male with PMHx of HIV (vl undetectable, good T cell count) and neuropathy presents to ED c/o l foot pain. Patient reports pain to the left foot since last night. States pain is worse with palpation with an area of redness. Denies any fever, numbness, tingling, or weakness, no recent injuries or trauma. States current pain is similar to that he experienced during episode of gout in the past. Denies any hx of cellulitis. Patient did not take any pain meds PTA.

## 2019-10-28 NOTE — ED PROVIDER NOTE - PHYSICAL EXAMINATION
VITAL SIGNS: I have reviewed nursing notes and confirm.  CONSTITUTIONAL: Well-developed; well-nourished; in no acute distress.  SKIN: Skin exam is warm and dry.  HEAD: Normocephalic; atraumatic.  EYES: PERRL, EOM intact; conjunctiva and sclera clear.  ENT: No nasal discharge; airway clear.  NECK: Supple; non tender.  CARD: S1, S2 normal; no murmurs, gallops, or rubs. Regular rate and rhythm.  RESP: Unlabored. No wheezes, rales or rhonchi.  ABD: soft; non-distended; non-tender  EXT: 2x2cm area of warm, tender erythema to the medial aspect of the left foot over the 1st tarsometatarsal joint. Normal ROM. No cyanosis or edema. Distal pulses intact.  LYMPH: No acute cervical adenopathy.  NEURO: Alert, oriented. Grossly unremarkable.  PSYCH: Cooperative, appropriate.

## 2019-11-29 ENCOUNTER — EMERGENCY (EMERGENCY)
Facility: HOSPITAL | Age: 59
LOS: 1 days | Discharge: HOME | End: 2019-11-29
Attending: EMERGENCY MEDICINE | Admitting: EMERGENCY MEDICINE
Payer: MEDICARE

## 2019-11-29 VITALS
OXYGEN SATURATION: 99 % | TEMPERATURE: 98 F | RESPIRATION RATE: 20 BRPM | HEART RATE: 58 BPM | DIASTOLIC BLOOD PRESSURE: 74 MMHG | SYSTOLIC BLOOD PRESSURE: 112 MMHG

## 2019-11-29 VITALS
WEIGHT: 195.11 LBS | SYSTOLIC BLOOD PRESSURE: 183 MMHG | DIASTOLIC BLOOD PRESSURE: 95 MMHG | HEIGHT: 69 IN | RESPIRATION RATE: 16 BRPM | HEART RATE: 78 BPM | OXYGEN SATURATION: 100 % | TEMPERATURE: 97 F

## 2019-11-29 DIAGNOSIS — Z98.890 OTHER SPECIFIED POSTPROCEDURAL STATES: Chronic | ICD-10-CM

## 2019-11-29 LAB
ALBUMIN SERPL ELPH-MCNC: 3.9 G/DL — SIGNIFICANT CHANGE UP (ref 3.4–5)
ALP SERPL-CCNC: 50 U/L — SIGNIFICANT CHANGE UP (ref 40–120)
ALT FLD-CCNC: 40 U/L — SIGNIFICANT CHANGE UP (ref 12–42)
ANION GAP SERPL CALC-SCNC: 7 MMOL/L — LOW (ref 9–16)
AST SERPL-CCNC: 23 U/L — SIGNIFICANT CHANGE UP (ref 15–37)
BASOPHILS # BLD AUTO: 0.05 K/UL — SIGNIFICANT CHANGE UP (ref 0–0.2)
BASOPHILS NFR BLD AUTO: 0.6 % — SIGNIFICANT CHANGE UP (ref 0–2)
BILIRUB SERPL-MCNC: 0.8 MG/DL — SIGNIFICANT CHANGE UP (ref 0.2–1.2)
BUN SERPL-MCNC: 19 MG/DL — SIGNIFICANT CHANGE UP (ref 7–23)
CALCIUM SERPL-MCNC: 9.3 MG/DL — SIGNIFICANT CHANGE UP (ref 8.5–10.5)
CHLORIDE SERPL-SCNC: 104 MMOL/L — SIGNIFICANT CHANGE UP (ref 96–108)
CK MB BLD-MCNC: 0.82 % — SIGNIFICANT CHANGE UP
CK MB CFR SERPL CALC: 1.2 NG/ML — SIGNIFICANT CHANGE UP (ref 0.5–3.6)
CK SERPL-CCNC: 147 U/L — SIGNIFICANT CHANGE UP (ref 39–308)
CO2 SERPL-SCNC: 30 MMOL/L — SIGNIFICANT CHANGE UP (ref 22–31)
CREAT SERPL-MCNC: 1.28 MG/DL — SIGNIFICANT CHANGE UP (ref 0.5–1.3)
EOSINOPHIL # BLD AUTO: 0.09 K/UL — SIGNIFICANT CHANGE UP (ref 0–0.5)
EOSINOPHIL NFR BLD AUTO: 1.1 % — SIGNIFICANT CHANGE UP (ref 0–6)
GLUCOSE SERPL-MCNC: 133 MG/DL — HIGH (ref 70–99)
HCT VFR BLD CALC: 43.6 % — SIGNIFICANT CHANGE UP (ref 39–50)
HGB BLD-MCNC: 15.7 G/DL — SIGNIFICANT CHANGE UP (ref 13–17)
IMM GRANULOCYTES NFR BLD AUTO: 0.5 % — SIGNIFICANT CHANGE UP (ref 0–1.5)
LYMPHOCYTES # BLD AUTO: 1.43 K/UL — SIGNIFICANT CHANGE UP (ref 1–3.3)
LYMPHOCYTES # BLD AUTO: 18 % — SIGNIFICANT CHANGE UP (ref 13–44)
MCHC RBC-ENTMCNC: 29.9 PG — SIGNIFICANT CHANGE UP (ref 27–34)
MCHC RBC-ENTMCNC: 36 GM/DL — SIGNIFICANT CHANGE UP (ref 32–36)
MCV RBC AUTO: 83 FL — SIGNIFICANT CHANGE UP (ref 80–100)
MONOCYTES # BLD AUTO: 0.46 K/UL — SIGNIFICANT CHANGE UP (ref 0–0.9)
MONOCYTES NFR BLD AUTO: 5.8 % — SIGNIFICANT CHANGE UP (ref 2–14)
NEUTROPHILS # BLD AUTO: 5.88 K/UL — SIGNIFICANT CHANGE UP (ref 1.8–7.4)
NEUTROPHILS NFR BLD AUTO: 74 % — SIGNIFICANT CHANGE UP (ref 43–77)
NRBC # BLD: 0 /100 WBCS — SIGNIFICANT CHANGE UP (ref 0–0)
PLATELET # BLD AUTO: 212 K/UL — SIGNIFICANT CHANGE UP (ref 150–400)
POTASSIUM SERPL-MCNC: 3.5 MMOL/L — SIGNIFICANT CHANGE UP (ref 3.5–5.3)
POTASSIUM SERPL-SCNC: 3.5 MMOL/L — SIGNIFICANT CHANGE UP (ref 3.5–5.3)
PROT SERPL-MCNC: 7.2 G/DL — SIGNIFICANT CHANGE UP (ref 6.4–8.2)
RBC # BLD: 5.25 M/UL — SIGNIFICANT CHANGE UP (ref 4.2–5.8)
RBC # FLD: 12.7 % — SIGNIFICANT CHANGE UP (ref 10.3–14.5)
SODIUM SERPL-SCNC: 141 MMOL/L — SIGNIFICANT CHANGE UP (ref 132–145)
TROPONIN I SERPL-MCNC: <0.017 NG/ML — LOW (ref 0.02–0.06)
WBC # BLD: 7.95 K/UL — SIGNIFICANT CHANGE UP (ref 3.8–10.5)
WBC # FLD AUTO: 7.95 K/UL — SIGNIFICANT CHANGE UP (ref 3.8–10.5)

## 2019-11-29 PROCEDURE — 93010 ELECTROCARDIOGRAM REPORT: CPT

## 2019-11-29 PROCEDURE — 99284 EMERGENCY DEPT VISIT MOD MDM: CPT

## 2019-11-29 RX ORDER — AZITHROMYCIN 500 MG/1
500 TABLET, FILM COATED ORAL ONCE
Refills: 0 | Status: COMPLETED | OUTPATIENT
Start: 2019-11-29 | End: 2019-11-29

## 2019-11-29 RX ORDER — ASPIRIN/CALCIUM CARB/MAGNESIUM 324 MG
325 TABLET ORAL ONCE
Refills: 0 | Status: COMPLETED | OUTPATIENT
Start: 2019-11-29 | End: 2019-11-29

## 2019-11-29 RX ADMIN — Medication 325 MILLIGRAM(S): at 16:50

## 2019-11-29 RX ADMIN — AZITHROMYCIN 500 MILLIGRAM(S): 500 TABLET, FILM COATED ORAL at 16:50

## 2019-11-29 NOTE — ED PROVIDER NOTE - PATIENT PORTAL LINK FT
You can access the FollowMyHealth Patient Portal offered by [facility] by registering at the following website: [email or web address]. By joining [email or web address]’s FollowMyHealth portal, you will also be able to view your health information using other applications (apps) compatible with our system.

## 2019-11-29 NOTE — ED ADULT NURSE NOTE - OBJECTIVE STATEMENT
pt. c/o an episode of chest pressure yesterday while flying home from [place]. Pt. also reports a cough for a week which has become productive over the last few days.

## 2019-11-29 NOTE — ED PROVIDER NOTE - CLINICAL SUMMARY MEDICAL DECISION MAKING FREE TEXT BOX
Pt with episodic chest pressure and cough x 1 week. No wheezing rales or rhonchi on exam. pt comfortable appearing, in no acute distress.  1) EKG/Cardiac enzymes/LABS/Aspirin  2) Reassess Pt with episodic chest pressure and cough x 1 week. No wheezing rales or rhonchi on exam. pt comfortable appearing, in no acute distress.  1) EKG/Cardiac enzymes/LABS/Aspirin  2) Reassess    enzymes negative, EKG OK, no signs of ischemia, sx approx 24 hours ago and enzymes are negative, stable for dc home, r ecc outpatient cardiology followup, pt has a pmd and cardio and he agreed to followup.

## 2019-11-29 NOTE — ED PROVIDER NOTE - OBJECTIVE STATEMENT
58 yo M w/ PMHx of HIV (VL undetectable, CD4), HTN on atenolol, HLD, angioplasty, bronchitis c/o episode of chest pressure yesterday lasting less than a minute. Pt also c/o productive cough x 1 week; Pt recently returned from [place] and reports similar symptoms after trips to [place], in which symptoms improve after a [place]. Pt reports previous episode of chest pressure 3 weeks ago, lasting a minute, while in [place]. Denies fever, chills, palpitations, diaphoresis, KLINE, SOB, hemoptysis, wheezing, peripheral edema, numbness, tingling, HA, dizziness, neck pain, N/V/D, abdominal pain.

## 2019-11-29 NOTE — ED ADULT TRIAGE NOTE - CHIEF COMPLAINT QUOTE
walk in with c/o intermittent left sided non-radiating chest pressure, most recent episode last night while on flight home from [place], self resolved, no I pain at this time. Current complaint of 1xweek of productive cough without SOB, no fevers worsening this morning.

## 2019-11-29 NOTE — ED PROVIDER NOTE - CHPI ED SYMPTOMS NEG
no back pain/no chills/no nausea/no fever/no diaphoresis/no dizziness/no shortness of breath/no vomiting

## 2019-12-03 ENCOUNTER — APPOINTMENT (OUTPATIENT)
Dept: HEART AND VASCULAR | Facility: CLINIC | Age: 59
End: 2019-12-03
Payer: MEDICARE

## 2019-12-03 ENCOUNTER — NON-APPOINTMENT (OUTPATIENT)
Age: 59
End: 2019-12-03

## 2019-12-03 VITALS — DIASTOLIC BLOOD PRESSURE: 82 MMHG | SYSTOLIC BLOOD PRESSURE: 132 MMHG

## 2019-12-03 VITALS
BODY MASS INDEX: 28.2 KG/M2 | HEIGHT: 70 IN | DIASTOLIC BLOOD PRESSURE: 89 MMHG | HEART RATE: 88 BPM | WEIGHT: 197 LBS | TEMPERATURE: 97.6 F | SYSTOLIC BLOOD PRESSURE: 138 MMHG | OXYGEN SATURATION: 94 %

## 2019-12-03 DIAGNOSIS — E78.1 PURE HYPERGLYCERIDEMIA: ICD-10-CM

## 2019-12-03 DIAGNOSIS — I10 ESSENTIAL (PRIMARY) HYPERTENSION: ICD-10-CM

## 2019-12-03 DIAGNOSIS — Z98.890 OTHER SPECIFIED POSTPROCEDURAL STATES: ICD-10-CM

## 2019-12-03 DIAGNOSIS — Z87.09 PERSONAL HISTORY OF OTHER DISEASES OF THE RESPIRATORY SYSTEM: ICD-10-CM

## 2019-12-03 DIAGNOSIS — Z09 ENCOUNTER FOR FOLLOW-UP EXAMINATION AFTER COMPLETED TREATMENT FOR CONDITIONS OTHER THAN MALIGNANT NEOPLASM: ICD-10-CM

## 2019-12-03 DIAGNOSIS — R07.89 OTHER CHEST PAIN: ICD-10-CM

## 2019-12-03 DIAGNOSIS — Z88.8 ALLERGY STATUS TO OTHER DRUGS, MEDICAMENTS AND BIOLOGICAL SUBSTANCES STATUS: ICD-10-CM

## 2019-12-03 DIAGNOSIS — B20 HUMAN IMMUNODEFICIENCY VIRUS [HIV] DISEASE: ICD-10-CM

## 2019-12-03 DIAGNOSIS — G89.29 OTHER CHRONIC PAIN: ICD-10-CM

## 2019-12-03 DIAGNOSIS — E78.00 PURE HYPERCHOLESTEROLEMIA, UNSPECIFIED: ICD-10-CM

## 2019-12-03 DIAGNOSIS — K22.70 BARRETT'S ESOPHAGUS W/OUT DYSPLASIA: ICD-10-CM

## 2019-12-03 DIAGNOSIS — Z79.899 OTHER LONG TERM (CURRENT) DRUG THERAPY: ICD-10-CM

## 2019-12-03 DIAGNOSIS — R05 COUGH: ICD-10-CM

## 2019-12-03 DIAGNOSIS — E78.2 MIXED HYPERLIPIDEMIA: ICD-10-CM

## 2019-12-03 DIAGNOSIS — Z87.898 PERSONAL HISTORY OF OTHER SPECIFIED CONDITIONS: ICD-10-CM

## 2019-12-03 DIAGNOSIS — I25.10 ATHEROSCLEROTIC HEART DISEASE OF NATIVE CORONARY ARTERY W/OUT ANGINA PECTORIS: ICD-10-CM

## 2019-12-03 PROCEDURE — 93000 ELECTROCARDIOGRAM COMPLETE: CPT

## 2019-12-03 PROCEDURE — 99205 OFFICE O/P NEW HI 60 MIN: CPT | Mod: 25

## 2019-12-03 RX ORDER — BUPRENORPHINE HYDROCHLORIDE, NALOXONE HYDROCHLORIDE 4; 1 MG/1; MG/1
4-1 FILM, SOLUBLE BUCCAL; SUBLINGUAL
Refills: 0 | Status: ACTIVE | COMMUNITY

## 2019-12-03 RX ORDER — ESOMEPRAZOLE MAGNESIUM 20 MG/1
20 CAPSULE, DELAYED RELEASE ORAL DAILY
Refills: 0 | Status: ACTIVE | COMMUNITY
Start: 2017-07-05

## 2019-12-03 RX ORDER — DOLUTEGRAVIR SODIUM AND RILPIVIRINE HYDROCHLORIDE 50; 25 MG/1; MG/1
50-25 TABLET, FILM COATED ORAL DAILY
Refills: 0 | Status: ACTIVE | COMMUNITY

## 2019-12-04 PROBLEM — Z09 POSTOP CHECK: Status: RESOLVED | Noted: 2017-07-05 | Resolved: 2019-12-04

## 2019-12-04 PROBLEM — Z87.09 HISTORY OF PLEURISY: Status: RESOLVED | Noted: 2017-07-07 | Resolved: 2019-12-04

## 2019-12-04 PROBLEM — Z98.890 STATUS POST LUNG SURGERY: Status: RESOLVED | Noted: 2017-08-01 | Resolved: 2019-12-04

## 2019-12-04 NOTE — REASON FOR VISIT
[Initial Evaluation] : an initial evaluation of [FreeTextEntry1] : Diagnostic Tests:\par --------------------------------\par ECG:\par 12/03/19: NSR, JEFFERSON.\par 11/29/15: NSR, JEFFERSON.\par --------------------------------\par Cath:\par 05/07/12: mild diffuse disease, EF 60%. \par --------------------------------\par Echo:\par 12/02/10: EF 68%, normal study.

## 2019-12-04 NOTE — ASSESSMENT
[FreeTextEntry1] : 1. CAD: mild diffuse (s/p cath: 05/07/12: mild diffuse disease, EF 60%)\par            - will pursue medical management \par            - will send for an echocardiogram to evaluate LV function\par \par 2. HTN: BP near ACC/AHA 2017 guideline target\par            - continue atenolol/chlorthalidone 50/25mg po QD\par            - if BP remains above target next visit will up titrate anti-HTN regimen\par \par 3. Dyslipidemia: \par            - continue fenofibrate 50mg po QD\par            - discussed therapeutic lifestyle changes to promote improved lipid metabolism\par            - patient will provide copy of recent lab work from PMD's office for my review.\par            - will consider initiating statin and addressing any residual hypertriglyceridemia given antiretroviral therapy\par \par 4. HIV+: x > 30 years, viral load undetectable per patient\par            - continue Juluca 50/25mg po QD\par            - follow up with HIV specialist\par \par 5. Chest pain: atypical for cardiac etiology, likely secondary to GERD\par            - follow up with gastroenterologist\par \par 6. 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 \par         \par            \par

## 2019-12-04 NOTE — PHYSICAL EXAM
[General Appearance - Well Developed] : well developed [Normal Appearance] : normal appearance [Well Groomed] : well groomed [General Appearance - Well Nourished] : well nourished [No Deformities] : no deformities [General Appearance - In No Acute Distress] : no acute distress [Eyelids - No Xanthelasma] : the eyelids demonstrated no xanthelasmas [Normal Conjunctiva] : the conjunctiva exhibited no abnormalities [Normal Oral Mucosa] : normal oral mucosa [No Oral Pallor] : no oral pallor [No Oral Cyanosis] : no oral cyanosis [Normal Jugular Venous A Waves Present] : normal jugular venous A waves present [Normal Jugular Venous V Waves Present] : normal jugular venous V waves present [No Jugular Venous Trujillo A Waves] : no jugular venous trujillo A waves [Normal S1] : normal S1 [Normal Rate] : normal [Normal S2] : normal S2 [No Murmur] : no murmurs heard [2+] : left 2+ [No Pitting Edema] : no pitting edema present [No Abnormalities] : the abdominal aorta was not enlarged and no bruit was heard [Respiration, Rhythm And Depth] : normal respiratory rhythm and effort [Exaggerated Use Of Accessory Muscles For Inspiration] : no accessory muscle use [Auscultation Breath Sounds / Voice Sounds] : lungs were clear to auscultation bilaterally [Abdomen Tenderness] : non-tender [Abdomen Soft] : soft [Abdomen Mass (___ Cm)] : no abdominal mass palpated [Abnormal Walk] : normal gait [Nail Clubbing] : no clubbing of the fingernails [Petechial Hemorrhages (___cm)] : no petechial hemorrhages [Cyanosis, Localized] : no localized cyanosis [Skin Color & Pigmentation] : normal skin color and pigmentation [] : no rash [No Venous Stasis] : no venous stasis [Skin Lesions] : no skin lesions [No Skin Ulcers] : no skin ulcer [Oriented To Time, Place, And Person] : oriented to person, place, and time [No Xanthoma] : no  xanthoma was observed [Mood] : the mood was normal [Affect] : the affect was normal [No Anxiety] : not feeling anxious [S4] : no S4 [S3] : no S3 [Right Femoral Bruit] : no bruit heard over the right femoral artery [Left Carotid Bruit] : no bruit heard over the left carotid [Right Carotid Bruit] : no bruit heard over the right carotid [Left Femoral Bruit] : no bruit heard over the left femoral artery [FreeTextEntry1] : severe muscle atrophy right hip

## 2019-12-04 NOTE — HISTORY OF PRESENT ILLNESS
[FreeTextEntry1] : Mr. Finn presents for initial evaluation and CAD ([facility] 05/07/12 with mild diffuse disease), HIV+, ex-substance and ETOH abuse, dyslipidemia, COPD, HTN, GERD, Hou's esophagus, bipolar disorder, and left spontaneous pneumothorax s/p VATS blebectomy and mechanical pleurodesis (06/28/17). He was previous seen by multiple other cardiologists at multiple institutions including Theo Rocha MD, Aakash Cline MD, and Caroline Morales MD. He had a diagnostic invasive coronary artery angiogram on 05/07/12 which revealed mild diffuse CAD and an EF of 60%.   He was evaluated at the [facility] ED on 11/29/19 with complaints of chest pain.  He describes the pain as burning, midsternal, nonexertional, nonradiating, and lasting minutes at a time.  He ruled out for an ACS and was discharged.  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  At present, he feels well.  \par \par \par \par \par \par He was evaluated at [facility] at on 11/29/19 with complaints of chest pain.

## 2019-12-13 ENCOUNTER — APPOINTMENT (OUTPATIENT)
Dept: HEART AND VASCULAR | Facility: CLINIC | Age: 59
End: 2019-12-13
Payer: MEDICARE

## 2019-12-13 PROCEDURE — 93306 TTE W/DOPPLER COMPLETE: CPT

## 2019-12-13 NOTE — HISTORY OF PRESENT ILLNESS
[FreeTextEntry1] : Mr. Finn presents for initial evaluation and CAD ([facility] 05/07/12 with mild diffuse disease), HIV+, ex-substance and ETOH abuse, dyslipidemia, COPD, HTN, GERD, Hou's esophagus, bipolar disorder, and left spontaneous pneumothorax s/p VATS blebectomy and mechanical pleurodesis (06/28/17). He was previous seen by multiple other cardiologists at multiple institutions including Theo Rocha MD, Aakash Cline MD, and Caroline Morales MD. He had a diagnostic invasive coronary artery angiogram on 05/07/12 which revealed mild diffuse CAD and an EF of 60%.   He was evaluated at the [facility] ED on 11/29/19 with complaints of chest pain.  He describes the pain as burning, midsternal, nonexertional, nonradiating, and lasting minutes at a time.  He ruled out for an ACS and was discharged.  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  At present, he feels well.  \par \par \par \par \par \par He was evaluated at [facility] at on 11/29/19 with complaints of chest pain.

## 2019-12-13 NOTE — PHYSICAL EXAM
[General Appearance - Well Developed] : well developed [Normal Appearance] : normal appearance [General Appearance - In No Acute Distress] : no acute distress [General Appearance - Well Nourished] : well nourished [No Deformities] : no deformities [Well Groomed] : well groomed [Eyelids - No Xanthelasma] : the eyelids demonstrated no xanthelasmas [Normal Conjunctiva] : the conjunctiva exhibited no abnormalities [Normal Oral Mucosa] : normal oral mucosa [No Oral Cyanosis] : no oral cyanosis [No Oral Pallor] : no oral pallor [Normal Jugular Venous A Waves Present] : normal jugular venous A waves present [No Jugular Venous Trujillo A Waves] : no jugular venous trujillo A waves [Respiration, Rhythm And Depth] : normal respiratory rhythm and effort [Normal Jugular Venous V Waves Present] : normal jugular venous V waves present [Exaggerated Use Of Accessory Muscles For Inspiration] : no accessory muscle use [Auscultation Breath Sounds / Voice Sounds] : lungs were clear to auscultation bilaterally [Abdomen Tenderness] : non-tender [Abdomen Soft] : soft [FreeTextEntry1] : severe muscle atrophy right hip [Abdomen Mass (___ Cm)] : no abdominal mass palpated [Abnormal Walk] : normal gait [Nail Clubbing] : no clubbing of the fingernails [Petechial Hemorrhages (___cm)] : no petechial hemorrhages [Cyanosis, Localized] : no localized cyanosis [Skin Color & Pigmentation] : normal skin color and pigmentation [] : no rash [No Venous Stasis] : no venous stasis [Skin Lesions] : no skin lesions [No Xanthoma] : no  xanthoma was observed [No Skin Ulcers] : no skin ulcer [Oriented To Time, Place, And Person] : oriented to person, place, and time [No Anxiety] : not feeling anxious [Mood] : the mood was normal [Affect] : the affect was normal [Normal Rate] : normal [Normal S2] : normal S2 [Normal S1] : normal S1 [No Murmur] : no murmurs heard [S3] : no S3 [S4] : no S4 [Left Carotid Bruit] : no bruit heard over the left carotid [Right Carotid Bruit] : no bruit heard over the right carotid [Right Femoral Bruit] : no bruit heard over the right femoral artery [Left Femoral Bruit] : no bruit heard over the left femoral artery [No Abnormalities] : the abdominal aorta was not enlarged and no bruit was heard [No Pitting Edema] : no pitting edema present [2+] : left 2+

## 2019-12-13 NOTE — REASON FOR VISIT
[Initial Evaluation] : an initial evaluation of [FreeTextEntry1] : Diagnostic Tests:\par --------------------------------\par ECG:\par 12/03/19: NSR, JEFFERSON.\par 11/29/15: NSR, JEFFERSON.\par --------------------------------\par Cath:\par 05/07/12: mild diffuse disease, EF 60%. \par --------------------------------\par Echo:\par 12/13/19: EF 64%, normal study. \par 12/02/10: EF 68%, normal study.

## 2020-04-17 NOTE — ED PROVIDER NOTE - CHPI ED SYMPTOMS NEG
no change in level of consciousness/no CP, abd pain
Adequate: hears normal conversation without difficulty
regular

## 2020-05-07 NOTE — ED ADULT NURSE NOTE - CAS TRG GEN SKIN CONDITION
5/7/2020:  CTL trials given.  try unconv monoV with OS lens only will still be able to read with OD near SC.  Also has -1.25 Oasys Transitions lens so will try DANNY OU with CTL but ed WILL need NVO with this set-up.
normal (ped)...
Warm/Dry

## 2020-06-06 ENCOUNTER — EMERGENCY (EMERGENCY)
Facility: HOSPITAL | Age: 60
LOS: 1 days | Discharge: HOME | End: 2020-06-06
Attending: EMERGENCY MEDICINE | Admitting: EMERGENCY MEDICINE
Payer: MEDICARE

## 2020-06-06 VITALS
WEIGHT: 195.11 LBS | OXYGEN SATURATION: 96 % | HEART RATE: 63 BPM | DIASTOLIC BLOOD PRESSURE: 77 MMHG | SYSTOLIC BLOOD PRESSURE: 149 MMHG | RESPIRATION RATE: 18 BRPM | HEIGHT: 69 IN | TEMPERATURE: 99 F

## 2020-06-06 DIAGNOSIS — Z98.890 OTHER SPECIFIED POSTPROCEDURAL STATES: Chronic | ICD-10-CM

## 2020-06-06 PROCEDURE — 99283 EMERGENCY DEPT VISIT LOW MDM: CPT

## 2020-06-06 PROCEDURE — 73502 X-RAY EXAM HIP UNI 2-3 VIEWS: CPT | Mod: 26,LT

## 2020-06-06 NOTE — ED PROVIDER NOTE - PATIENT PORTAL LINK FT
You can access the FollowMyHealth Patient Portal offered by [facility] by registering at the following website: [email or web address]. By joining [employer]’s FollowMyHealth portal, you will also be able to view your health information using other applications (apps) compatible with our system.

## 2020-06-06 NOTE — ED PROVIDER NOTE - NSFOLLOWUPINSTRUCTIONS_ED_ALL_ED_FT
follow up with your doctor as scheduled in 3 days    tylenol for pain    return to ER for any concern

## 2020-06-06 NOTE — ED PROVIDER NOTE - MUSCULOSKELETAL, MLM
No cervical, thoracic ot lumbar spine tenderness to percussion or palpation. Flexion/extension of spine without pain. while lying in bed FROM flex/ext internal/external rotation 2+ femoral dp and pt tib pulses compartments soft . pain with weight bearing

## 2020-06-06 NOTE — ED PROVIDER NOTE - OBJECTIVE STATEMENT
61 yo male with history of bilateral AVN s/p right hip replacement presents with slowly progressive left hip pain "similar" "familiar" to pain in right hip  pt has appointment with his urologic surgeon in 3 days to discuss operative intervention.     pain with weight bearing     no recent trauma. to left hip. no swelling redness or pain to LLE.   also with 35 year history of HIV compliant with meds

## 2020-06-06 NOTE — ED ADULT TRIAGE NOTE - CHIEF COMPLAINT QUOTE
c/o left hip x 5 days, worsening over the past 3 days and now ambulating with cane. denies recent injury. has appt with ortho surgeon this week.

## 2020-06-10 DIAGNOSIS — M25.552 PAIN IN LEFT HIP: ICD-10-CM

## 2020-06-10 DIAGNOSIS — Z88.8 ALLERGY STATUS TO OTHER DRUGS, MEDICAMENTS AND BIOLOGICAL SUBSTANCES STATUS: ICD-10-CM

## 2020-06-10 DIAGNOSIS — Z79.899 OTHER LONG TERM (CURRENT) DRUG THERAPY: ICD-10-CM

## 2020-06-10 DIAGNOSIS — Z79.891 LONG TERM (CURRENT) USE OF OPIATE ANALGESIC: ICD-10-CM

## 2020-06-10 DIAGNOSIS — Z79.2 LONG TERM (CURRENT) USE OF ANTIBIOTICS: ICD-10-CM

## 2020-06-10 DIAGNOSIS — Z79.51 LONG TERM (CURRENT) USE OF INHALED STEROIDS: ICD-10-CM

## 2020-07-06 ENCOUNTER — EMERGENCY (EMERGENCY)
Facility: HOSPITAL | Age: 60
LOS: 1 days | Discharge: HOME | End: 2020-07-06
Admitting: EMERGENCY MEDICINE
Payer: MEDICARE

## 2020-07-06 VITALS
RESPIRATION RATE: 18 BRPM | SYSTOLIC BLOOD PRESSURE: 106 MMHG | DIASTOLIC BLOOD PRESSURE: 83 MMHG | OXYGEN SATURATION: 96 % | WEIGHT: 190.04 LBS | HEIGHT: 69 IN | HEART RATE: 63 BPM | TEMPERATURE: 98 F

## 2020-07-06 VITALS
RESPIRATION RATE: 16 BRPM | SYSTOLIC BLOOD PRESSURE: 150 MMHG | TEMPERATURE: 98 F | OXYGEN SATURATION: 98 % | HEART RATE: 60 BPM | DIASTOLIC BLOOD PRESSURE: 83 MMHG

## 2020-07-06 DIAGNOSIS — Z98.890 OTHER SPECIFIED POSTPROCEDURAL STATES: Chronic | ICD-10-CM

## 2020-07-06 PROCEDURE — 73562 X-RAY EXAM OF KNEE 3: CPT | Mod: 26,LT

## 2020-07-06 PROCEDURE — 99283 EMERGENCY DEPT VISIT LOW MDM: CPT | Mod: 25

## 2020-07-06 NOTE — ED ADULT TRIAGE NOTE - CHIEF COMPLAINT QUOTE
Pt complains of left knee pain. States "I think my knee popped out." Denies recent injury/fall. States "I can extend it but it hurts when putting it back." Pt complains of left knee pain. States "I think my knee popped out." Denies recent injury/fall. States "I can extend it but it hurts when putting it back." Hx of right hip replacement.

## 2020-07-06 NOTE — ED PROVIDER NOTE - OBJECTIVE STATEMENT
59 y/o PMh of HTN, bipolar, HIV on MANNING (reports high Tcell high count), R hip replacement 2015.  He is scheduled for a L hip replacement in 2 days.  He c/o L knee pain along lateral knee after a walk yesterday.  This morning he woke up from sleep and fell on his R side.      He states the pain is constant and worsens with flexion of the knee. 61 y/o PMh of HTN, arthritis, bipolar, HIV on MANNING (reports high Tcell high count), blebectomy s/p spontaneous pneumothorax, R hip replacement 2015 secondary to avascular necrosis.  He is scheduled for a L hip replacement in 2 days.  He c/o L knee pain along lateral knee after a long walk yesterday.  This morning he woke up from sleep with a shuffling gait and fell to his R sided secondary to pain.  He has taken Tylenol for pain.  Pt reports remote history of opiate dependence.  His pain is constant and worsens with flexion of the knee.  He denies numbness/tingling, direct trauma to knee, fevers/chills, swelling, redness, changes in sensation, back pain, rash.

## 2020-07-06 NOTE — ED ADULT NURSE NOTE - CHIEF COMPLAINT QUOTE
Pt complains of left knee pain. States "I think my knee popped out." Denies recent injury/fall. States "I can extend it but it hurts when putting it back." Hx of right hip replacement.

## 2020-07-06 NOTE — ED PROVIDER NOTE - CHIEF COMPLAINT
The patient is a 60y Male complaining of knee pain/injury.
If you are a smoker, it is important for your health to stop smoking. Please be aware that second hand smoke is also harmful.

## 2020-07-06 NOTE — ED PROVIDER NOTE - CLINICAL SUMMARY MEDICAL DECISION MAKING FREE TEXT BOX
59 y/o M presents to ED c/o L knee pain.  Pt well appearing, VSS. NAD.  Pt refusing pain medication.  Will wrap in ace bandage, knee immobilizer and crutches to rest and stabilize.  Pt has established relationship with orthopedics.  Pt advised to arrange f/u for further evaluation and MRI.  Return precautions advised.    Results and diagnosis discussed with patient.  Treatment plan discussed.  Return precautions discussed.  Pt verbalized understanding and given the opportunity to ask questions.  Patient advised to follow up with primary care provider.

## 2020-07-06 NOTE — ED PROVIDER NOTE - PATIENT PORTAL LINK FT
You can access the FollowMyHealth Patient Portal offered by [facility] by registering at the following website: [email or web address]. By joining [employer]’s FollowMyHealth portal, you will also be able to view your health information using other applications (apps) compatible with our system.

## 2020-07-06 NOTE — ED PROVIDER NOTE - DIAGNOSTIC INTERPRETATION
Interpreted by DHARMESH Masters  left, knee x-ray,3 views  No acute fracture, no dislocation (joint spaces grossly normal), no effusion

## 2020-07-06 NOTE — ED PROVIDER NOTE - MUSCULOSKELETAL MINIMAL EXAM
L knee:  no deformity, no effusion, +2 popliteal pulse.  FROM.  no laxity.  Mild non-tender swelling noted in popliteal space/atraumatic

## 2020-07-06 NOTE — ED ADULT NURSE NOTE - OBJECTIVE STATEMENT
Reports he has pain in his right knee that is more severe with flexion and weight bearing. Symptoms started this morning, and was described as a sharp shooting pain. No history of knee pain, position and rest was able to relieve pain

## 2020-07-06 NOTE — ED PROVIDER NOTE - NSFOLLOWUPINSTRUCTIONS_ED_ALL_ED_FT
Follow up with orthopedics as soon as possible for further evaluation and MRI of your left knee.  Take Tylenol 650 mg every 6-8 hours as needed for pain.    RICE: RICE, ICE, COMPRESS, ELEVATE  Rest and avoid use of injured area as much as possible.  Ice for 20 minutes 4-5 times per day.  Use a cloth to contain ice or ice pack.Do not apply ice directly on the skin.    Compress area with an ace bandage or splint if possible.  Elevate area as much as possible.    Return for weakness, numbness, swelling or other concerns.

## 2020-07-10 DIAGNOSIS — Z79.899 OTHER LONG TERM (CURRENT) DRUG THERAPY: ICD-10-CM

## 2020-07-10 DIAGNOSIS — M25.562 PAIN IN LEFT KNEE: ICD-10-CM

## 2020-07-10 DIAGNOSIS — Z88.8 ALLERGY STATUS TO OTHER DRUGS, MEDICAMENTS AND BIOLOGICAL SUBSTANCES: ICD-10-CM

## 2020-07-10 DIAGNOSIS — I10 ESSENTIAL (PRIMARY) HYPERTENSION: ICD-10-CM

## 2020-12-20 NOTE — ED PROVIDER NOTE - PSH
<-------- Click here to INCLUDE CoVID-19 Discharge Instructions No significant past surgical history

## 2021-02-06 ENCOUNTER — TRANSCRIPTION ENCOUNTER (OUTPATIENT)
Age: 61
End: 2021-02-06

## 2021-03-09 NOTE — ED PROVIDER NOTE - SKIN, MLM
no edema,  no murmurs,  regular rate and rhythm , no edema.
Skin normal color for race, warm, dry and intact. No evidence of rash.

## 2021-04-26 NOTE — ED ADULT NURSE NOTE - PMH
Avascular necrosis of bone of hip, right    Bipolar disorder    HIV (human immunodeficiency virus infection)    HTN (hypertension) Alternatives Discussed Intro (Do Not Add Period): I discussed alternative treatments to Mohs surgery and specifically discussed the risks and benefits of

## 2021-04-30 NOTE — ED ADULT NURSE NOTE - IN THE PAST YEAR, HOW OFTEN HAVE YOU USED TOBACCO PRODUCTS?
----- Message from Sarkis Maciel DO sent at 4/29/2021  9:50 AM CDT -----  Pt needs ultrasound of right breast to clarify a shadow. I tried calling her. Please assist in getting a hold of her   Monthly

## 2021-05-24 ENCOUNTER — EMERGENCY (EMERGENCY)
Facility: HOSPITAL | Age: 61
LOS: 1 days | Discharge: HOME | End: 2021-05-24
Attending: EMERGENCY MEDICINE | Admitting: EMERGENCY MEDICINE
Payer: MEDICARE

## 2021-05-24 VITALS
TEMPERATURE: 98 F | DIASTOLIC BLOOD PRESSURE: 96 MMHG | HEIGHT: 69 IN | RESPIRATION RATE: 16 BRPM | HEART RATE: 72 BPM | OXYGEN SATURATION: 96 % | SYSTOLIC BLOOD PRESSURE: 158 MMHG | WEIGHT: 214.95 LBS

## 2021-05-24 VITALS
SYSTOLIC BLOOD PRESSURE: 130 MMHG | TEMPERATURE: 98 F | HEART RATE: 55 BPM | DIASTOLIC BLOOD PRESSURE: 83 MMHG | OXYGEN SATURATION: 96 % | RESPIRATION RATE: 16 BRPM

## 2021-05-24 DIAGNOSIS — I10 ESSENTIAL (PRIMARY) HYPERTENSION: ICD-10-CM

## 2021-05-24 DIAGNOSIS — F31.9 BIPOLAR DISORDER, UNSPECIFIED: ICD-10-CM

## 2021-05-24 DIAGNOSIS — R10.12 LEFT UPPER QUADRANT PAIN: ICD-10-CM

## 2021-05-24 DIAGNOSIS — J93.9 PNEUMOTHORAX, UNSPECIFIED: ICD-10-CM

## 2021-05-24 DIAGNOSIS — E78.00 PURE HYPERCHOLESTEROLEMIA, UNSPECIFIED: ICD-10-CM

## 2021-05-24 DIAGNOSIS — Z98.890 OTHER SPECIFIED POSTPROCEDURAL STATES: Chronic | ICD-10-CM

## 2021-05-24 DIAGNOSIS — Z88.7 ALLERGY STATUS TO SERUM AND VACCINE: ICD-10-CM

## 2021-05-24 DIAGNOSIS — M19.90 UNSPECIFIED OSTEOARTHRITIS, UNSPECIFIED SITE: ICD-10-CM

## 2021-05-24 DIAGNOSIS — M87.051 IDIOPATHIC ASEPTIC NECROSIS OF RIGHT FEMUR: ICD-10-CM

## 2021-05-24 DIAGNOSIS — B20 HUMAN IMMUNODEFICIENCY VIRUS [HIV] DISEASE: ICD-10-CM

## 2021-05-24 LAB
ALBUMIN SERPL ELPH-MCNC: 4.2 G/DL — SIGNIFICANT CHANGE UP (ref 3.4–5)
ALP SERPL-CCNC: 53 U/L — SIGNIFICANT CHANGE UP (ref 40–120)
ALT FLD-CCNC: 56 U/L — HIGH (ref 12–42)
ANION GAP SERPL CALC-SCNC: 9 MMOL/L — SIGNIFICANT CHANGE UP (ref 9–16)
APPEARANCE UR: CLEAR — SIGNIFICANT CHANGE UP
AST SERPL-CCNC: 32 U/L — SIGNIFICANT CHANGE UP (ref 15–37)
BASOPHILS # BLD AUTO: 0.04 K/UL — SIGNIFICANT CHANGE UP (ref 0–0.2)
BASOPHILS NFR BLD AUTO: 0.4 % — SIGNIFICANT CHANGE UP (ref 0–2)
BILIRUB SERPL-MCNC: 0.6 MG/DL — SIGNIFICANT CHANGE UP (ref 0.2–1.2)
BILIRUB UR-MCNC: NEGATIVE — SIGNIFICANT CHANGE UP
BUN SERPL-MCNC: 19 MG/DL — SIGNIFICANT CHANGE UP (ref 7–23)
CALCIUM SERPL-MCNC: 9.9 MG/DL — SIGNIFICANT CHANGE UP (ref 8.5–10.5)
CHLORIDE SERPL-SCNC: 102 MMOL/L — SIGNIFICANT CHANGE UP (ref 96–108)
CO2 SERPL-SCNC: 30 MMOL/L — SIGNIFICANT CHANGE UP (ref 22–31)
COLOR SPEC: YELLOW — SIGNIFICANT CHANGE UP
CREAT SERPL-MCNC: 1.22 MG/DL — SIGNIFICANT CHANGE UP (ref 0.5–1.3)
DIFF PNL FLD: NEGATIVE — SIGNIFICANT CHANGE UP
EOSINOPHIL # BLD AUTO: 0.1 K/UL — SIGNIFICANT CHANGE UP (ref 0–0.5)
EOSINOPHIL NFR BLD AUTO: 1.1 % — SIGNIFICANT CHANGE UP (ref 0–6)
GLUCOSE SERPL-MCNC: 130 MG/DL — HIGH (ref 70–99)
GLUCOSE UR QL: NEGATIVE — SIGNIFICANT CHANGE UP
HCT VFR BLD CALC: 44.5 % — SIGNIFICANT CHANGE UP (ref 39–50)
HGB BLD-MCNC: 15.3 G/DL — SIGNIFICANT CHANGE UP (ref 13–17)
IMM GRANULOCYTES NFR BLD AUTO: 0.5 % — SIGNIFICANT CHANGE UP (ref 0–1.5)
KETONES UR-MCNC: NEGATIVE — SIGNIFICANT CHANGE UP
LEUKOCYTE ESTERASE UR-ACNC: NEGATIVE — SIGNIFICANT CHANGE UP
LIDOCAIN IGE QN: 142 U/L — SIGNIFICANT CHANGE UP (ref 73–393)
LYMPHOCYTES # BLD AUTO: 2.56 K/UL — SIGNIFICANT CHANGE UP (ref 1–3.3)
LYMPHOCYTES # BLD AUTO: 27.6 % — SIGNIFICANT CHANGE UP (ref 13–44)
MCHC RBC-ENTMCNC: 29.5 PG — SIGNIFICANT CHANGE UP (ref 27–34)
MCHC RBC-ENTMCNC: 34.4 GM/DL — SIGNIFICANT CHANGE UP (ref 32–36)
MCV RBC AUTO: 85.9 FL — SIGNIFICANT CHANGE UP (ref 80–100)
MONOCYTES # BLD AUTO: 0.59 K/UL — SIGNIFICANT CHANGE UP (ref 0–0.9)
MONOCYTES NFR BLD AUTO: 6.4 % — SIGNIFICANT CHANGE UP (ref 2–14)
NEUTROPHILS # BLD AUTO: 5.93 K/UL — SIGNIFICANT CHANGE UP (ref 1.8–7.4)
NEUTROPHILS NFR BLD AUTO: 64 % — SIGNIFICANT CHANGE UP (ref 43–77)
NITRITE UR-MCNC: NEGATIVE — SIGNIFICANT CHANGE UP
NRBC # BLD: 0 /100 WBCS — SIGNIFICANT CHANGE UP (ref 0–0)
NT-PROBNP SERPL-SCNC: 46 PG/ML — SIGNIFICANT CHANGE UP
PH UR: 7 — SIGNIFICANT CHANGE UP (ref 5–8)
PLATELET # BLD AUTO: 212 K/UL — SIGNIFICANT CHANGE UP (ref 150–400)
POTASSIUM SERPL-MCNC: 3.6 MMOL/L — SIGNIFICANT CHANGE UP (ref 3.5–5.3)
POTASSIUM SERPL-SCNC: 3.6 MMOL/L — SIGNIFICANT CHANGE UP (ref 3.5–5.3)
PROT SERPL-MCNC: 7.6 G/DL — SIGNIFICANT CHANGE UP (ref 6.4–8.2)
PROT UR-MCNC: NEGATIVE MG/DL — SIGNIFICANT CHANGE UP
RBC # BLD: 5.18 M/UL — SIGNIFICANT CHANGE UP (ref 4.2–5.8)
RBC # FLD: 13 % — SIGNIFICANT CHANGE UP (ref 10.3–14.5)
SODIUM SERPL-SCNC: 141 MMOL/L — SIGNIFICANT CHANGE UP (ref 132–145)
SP GR SPEC: <=1.005 — SIGNIFICANT CHANGE UP (ref 1–1.03)
TROPONIN I SERPL-MCNC: <0.017 NG/ML — LOW (ref 0.02–0.06)
UROBILINOGEN FLD QL: 0.2 E.U./DL — SIGNIFICANT CHANGE UP
WBC # BLD: 9.27 K/UL — SIGNIFICANT CHANGE UP (ref 3.8–10.5)
WBC # FLD AUTO: 9.27 K/UL — SIGNIFICANT CHANGE UP (ref 3.8–10.5)

## 2021-05-24 PROCEDURE — 74177 CT ABD & PELVIS W/CONTRAST: CPT | Mod: 26,MH

## 2021-05-24 PROCEDURE — 93010 ELECTROCARDIOGRAM REPORT: CPT

## 2021-05-24 PROCEDURE — 99285 EMERGENCY DEPT VISIT HI MDM: CPT

## 2021-05-24 PROCEDURE — 71046 X-RAY EXAM CHEST 2 VIEWS: CPT | Mod: 26

## 2021-05-24 NOTE — ED PROVIDER NOTE - CLINICAL SUMMARY MEDICAL DECISION MAKING FREE TEXT BOX
60yo M hx of HIV (CD4 >800) presents with intermittent sharp tearing pain in abd wall in LUQ, triggered by twisting.  On exam pt is afebrile, vital signs are stable.  Pt is very well appearing in no acute distress.  Abd is soft NDNT.  No palpable hernia in area of pain.  EKG nonischemic.  CXR clear.  Labs including troponin unremarkable.  CT a/p done to eval for AAA or other acute process intraabdominally or within abd wall; no acute processes noted.  Suspect likely muscle strain in abdominal wall.  offered analgesia to pt who refused as he has no symptoms at this time.  Return precautions discussed.  Pt stable for dc.

## 2021-05-24 NOTE — ED ADULT NURSE NOTE - TEMPLATE
Subjective:      Luis Castañeda is a 12 y.o. female here with mother. Patient brought in for med check      History of Present Illness:  Pt. In 5th grade, doing well.  All As and Bs.  Just moved back to [facility], will be starting at [employer] tomorrow.   Feels like medicine is working fine.   Appetite seems fine, sleeping well.          Review of Systems   Constitutional: Negative for activity change, appetite change, fatigue, fever and unexpected weight change.   HENT: Negative for congestion, nosebleeds and rhinorrhea.    Respiratory: Negative for cough and choking.    Cardiovascular: Negative for leg swelling.   Gastrointestinal: Negative for abdominal pain, constipation, diarrhea and vomiting.   Genitourinary: Negative for decreased urine volume and difficulty urinating.   Musculoskeletal: Negative for joint swelling.   Skin: Negative for rash.   Allergic/Immunologic: Negative for food allergies.   Neurological: Negative for speech difficulty, weakness and headaches.   Hematological: Negative for adenopathy. Does not bruise/bleed easily.   Psychiatric/Behavioral: Negative for behavioral problems, decreased concentration and sleep disturbance. The patient is not hyperactive.        Objective:     Physical Exam   Constitutional: She appears well-developed and well-nourished.   HENT:   Right Ear: Tympanic membrane normal.   Left Ear: Tympanic membrane normal.   Nose: Nose normal.   Mouth/Throat: Mucous membranes are moist. Dentition is normal. Oropharynx is clear. Pharynx is normal.   Eyes: Pupils are equal, round, and reactive to light.   Neck: Normal range of motion.   Cardiovascular: Normal rate and regular rhythm.    Pulmonary/Chest: Effort normal and breath sounds normal.   Genitourinary: There is no rash on the right labia.   Musculoskeletal: Normal range of motion.   Lymphadenopathy:     She has no cervical adenopathy.   Neurological: She is alert.   Skin: Skin is warm.       Assessment:        1.  Encounter for medication management in attention deficit hyperactivity disorder (ADHD)    2. Attention deficit disorder, unspecified hyperactivity presence         Plan:   Luis was seen today for med check.    Diagnoses and all orders for this visit:    Attention deficit disorder, unspecified hyperactivity presence  -     lisdexamfetamine (VYVANSE) 40 MG Cap; Take 1 capsule (40 mg total) by mouth once daily.    Encounter for medication management in attention deficit hyperactivity disorder (ADHD)    Other orders  -     dextroamphetamine-amphetamine (ADDERALL) 5 mg Tab; Take 5 mg by mouth once daily. After school      Patient Instructions   Will continue with vyvanse 40 mg daily, #30, 1rx sent  Continue with adderall 5mg after school, #30, 1rx sent  Call for refills  Follow up in 3 months           Abdominal Pain, N/V/D

## 2021-05-24 NOTE — ED PROVIDER NOTE - NS ED ROS FT
Constitutional:  No fever, No chills, No night sweats  Eyes:  No visual changes, No discharge, No redness  ENMT:  No epistaxis, no nasal congestion, no throat pain, no difficulty swallowing  CV:  No chest pain, No palpitations, No peripheral edema  Resp:  No cough, No shortness of breath  GI:  +abdominal pain, No vomiting, No diarrhea  MSK:  No neck pain or stiffness, No joint swelling or pain, No back pain  Neuro: no loss of consciousness, no gait abnormality, no headache, no sensory deficits, and no weakness.  Skin:  No abrasions, no lesions, no rashes  Psych:  No known mental health issues

## 2021-05-24 NOTE — ED ADULT NURSE NOTE - OBJECTIVE STATEMENT
Pt c/o intermittent "mild" LUQ abdominal pain x2 months. Denies nausea, vomiting, diarrhea. Abdomen nontender to palpation. Pt well appearing.

## 2021-05-24 NOTE — ED ADULT NURSE NOTE - NSIMPLEMENTINTERV_GEN_ALL_ED
Implemented All Universal Safety Interventions:  [place] to call system. Call bell, personal items and telephone within reach. Instruct patient to call for assistance. Room bathroom lighting operational. Non-slip footwear when patient is off stretcher. Physically safe environment: no spills, clutter or unnecessary equipment. Stretcher in lowest position, wheels locked, appropriate side rails in place.

## 2021-05-24 NOTE — ED ADULT TRIAGE NOTE - ACCOMPANIED BY
Pharmacy got the refill for the adderall 5 mg #60 but it is dick 6/26/21, they need rx for this month sent also   Self

## 2021-05-24 NOTE — ED PROVIDER NOTE - PATIENT PORTAL LINK FT
You can access the FollowMyHealth Patient Portal offered by [facility] by registering at the following website: [email or web address]. By joining [employer]’s FollowMyHealth portal, you will also be able to view your health information using other applications (apps) compatible with our system.

## 2021-06-23 NOTE — ED PROVIDER NOTE - MUSCULOSKELETAL, MLM
Spine appears normal, range of motion is not limited, no muscle or joint tenderness
This document is complete and the patient is ready for discharge.

## 2021-08-02 NOTE — ED PROVIDER NOTE - NORMAL, MLM
*** INCOMPLETE ***    Patient is a 65y old  Female who presents with a chief complaint of Hiatal Hernia (02 Aug 2021 12:15)      HPI/HOSPITAL COURSE:  HPI:  65 year old female, former smoker, quit in January after developing Covid, with a PMHx of HTN, TIA 7 yrs ago, COVID 1/21, IBS, migraines, chronic neck and back pain, cholecystectomy, and hiatal hernia repair (2010), now with recurrent hernia. She presented to Dr. Baker office today to review esophagram and gastric emptying study. In office patient states she has been very nauseous with severe abdominal pain, vomitting multiple times during the day. Patient appears unwell and pale, referred to the ED For hydration, bloodwork and CT Abdomen with IV/Oral contrast. Upon exam patient appears in discomfort, states pain on mildly controlled typically takes 1-1.5mg of dilaudid for her pain. Patient able to tolerate PO contrast. Patient otherwise denies dizziness, vision changes, chest pain, palpitations, shortness of breath, cough, n/v/d, extremity swelling, calf tenderness, sick contacts.     Patient not currently vaccinated, awaiting COVID swab.  (29 Jul 2021 16:39)        INTERVAL EVENTS:    SUBJECTIVE HPI: Patient seen and examined at bedside. Patient resting comfortably, no complaints at this time. Patient denies: fever, chills, weakness, dizziness, headaches, changes in vision, chest pain, palpitations, shortness of breath, cough, N/V, diarrhea or constipation, dysuria, LE edema. ROS otherwise negative.      PAST MEDICAL & SURGICAL HISTORY:  HTN (hypertension)    HLD (hyperlipidemia)    History of COVID-19  1/2021    History of TIAs  2014    S/P cholecystectomy    History of repair of hiatal hernia  2010        FAMILY HISTORY:      SOCIAL HISTORY:     -Cigarrettes:     -Alcohol:     -Ilicit Drug Use:    Home Medications:  amLODIPine 2.5 mg oral tablet: 1 tab(s) orally once a day (29 Jul 2021 18:08)  cetirizine 10 mg oral tablet: 1 tab(s) orally once a day (29 Jul 2021 18:08)  citalopram 20 mg oral tablet: 1 tab(s) orally once a day (29 Jul 2021 18:08)  clonazePAM 1 mg oral tablet: orally once a day (at bedtime), As Needed (29 Jul 2021 18:08)  cyclobenzaprine 10 mg oral tablet: 1 tab(s) orally 3 times a day, As Needed (29 Jul 2021 18:08)  Dexilant 60 mg oral delayed release capsule: 1 cap(s) orally once a day (29 Jul 2021 18:08)  dicyclomine 20 mg oral tablet: 1 tab(s) orally 4 times a day, As Needed (29 Jul 2021 18:08)  Imitrex 100 mg oral tablet: 1 tab(s) orally once, As Needed (29 Jul 2021 18:08)  losartan 50 mg oral tablet: 1 tab(s) orally once a day (29 Jul 2021 18:08)  Zofran 4 mg oral tablet: 1 tab(s) orally every 8 hours, As Needed (29 Jul 2021 18:08)  zolpidem 10 mg oral tablet: 1 tab(s) orally once a day (at bedtime) (29 Jul 2021 18:08)      MEDICATIONS  (STANDING):  amLODIPine   Tablet 2.5 milliGRAM(s) Oral daily  bisacodyl 5 milliGRAM(s) Oral every 12 hours  citalopram 20 milliGRAM(s) Oral daily  heparin   Injectable 5000 Unit(s) SubCutaneous every 8 hours  losartan 50 milliGRAM(s) Oral daily  ondansetron Injectable 4 milliGRAM(s) IV Push every 6 hours  pantoprazole  Injectable 40 milliGRAM(s) IV Push every 12 hours  polyethylene glycol 3350 17 Gram(s) Oral daily  senna 2 Tablet(s) Oral at bedtime  sodium chloride 0.9% lock flush 3 milliLiter(s) IV Push every 8 hours  sodium chloride 0.9%. 1000 milliLiter(s) (75 mL/Hr) IV Continuous <Continuous>    MEDICATIONS  (PRN):  acetaminophen   Tablet .. 650 milliGRAM(s) Oral every 6 hours PRN Severe Pain (7 - 10)  aluminum hydroxide/magnesium hydroxide/simethicone Suspension 30 milliLiter(s) Oral every 4 hours PRN Dyspepsia  clonazePAM  Tablet 1 milliGRAM(s) Oral at bedtime PRN agitation/ insomnia  cyclobenzaprine 10 milliGRAM(s) Oral three times a day PRN Muscle Spasm  zolpidem 5 milliGRAM(s) Oral at bedtime PRN Insomnia      VITAL SIGNS:  Vital Signs Last 24 Hrs  T(C): 36.1 (02 Aug 2021 13:37), Max: 36.8 (01 Aug 2021 14:12)  T(F): 97 (02 Aug 2021 13:37), Max: 98.3 (01 Aug 2021 14:12)  HR: 92 (02 Aug 2021 09:31) (78 - 94)  BP: 134/89 (02 Aug 2021 09:31) (109/58 - 138/86)  BP(mean): 106 (02 Aug 2021 09:31) (76 - 106)  RR: 16 (02 Aug 2021 09:31) (16 - 19)  SpO2: 98% (02 Aug 2021 09:31) (96% - 98%)    I&O's Detail    01 Aug 2021 07:01  -  02 Aug 2021 07:00  --------------------------------------------------------  IN:    IV PiggyBack: 200 mL    Oral Fluid: 120 mL    sodium chloride 0.9%: 1650 mL  Total IN: 1970 mL    OUT:    Voided (mL): 1550 mL  Total OUT: 1550 mL    Total NET: 420 mL      02 Aug 2021 07:01  -  02 Aug 2021 13:47  --------------------------------------------------------  IN:  Total IN: 0 mL    OUT:    Voided (mL): 600 mL  Total OUT: 600 mL    Total NET: -600 mL          PHYSICAL EXAM:  General: Comfortable, pleasant/anxious/agitated, Ill-appearing, well-nourished/frail/cachectic, comfortable / in distress  Neurological: AAOx3, no focal deficits  HEENT: NC/AT; EOMI, PERRL, clear conjunctiva, no nasal or oropharyngeal discharge or exudates, MMM  Neck: supple, no cervical or post-auricular lymphadenopathy  Cardiovascular: +S1/S2, no murmurs/rubs/gallops, RRR  Respiratory: CTA B/L, no diminished breath sounds, no wheezes/rales/rhonchi, no increased work of breathing or accessory muscle use  Gastrointestinal: soft, NT/ND; active BSx4 quadrants  Genitourinary: no suprapubic tenderness, no CVA tenderness  Extremities: WWP; no edema, clubbing or cyanosis  Vascular: 2+ radial, DP/PT pulses B/L  Skin: no rashes  Lines/Drains:     LABS:                        11.5   4.02  )-----------( 211      ( 02 Aug 2021 06:26 )             33.5     08-02    140  |  108  |  8   ----------------------------<  66<L>  3.7   |  16<L>  |  0.60    Ca    9.2      02 Aug 2021 06:26  Mg     1.8     08-02    TPro  6.0  /  Alb  3.8  /  TBili  0.4  /  DBili  x   /  AST  14  /  ALT  14  /  AlkPhos  116  08-02            BNP            Microbiology:        RADIOLOGY & ADDITIONAL STUDIES: Reviewed. Patient is a 65y old  Female who presents with a chief complaint of Hiatal Hernia (02 Aug 2021 12:15)      HPI/HOSPITAL COURSE:  HPI:  65 year old female, former smoker, quit in January after developing Covid, with a PMHx of HTN, TIA 7 yrs ago, COVID 1/21, IBS, migraines, chronic neck and back pain, cholecystectomy, and hiatal hernia repair (2010), now with recurrent hernia. She presented to Dr. Baker office today to review esophagram and gastric emptying study. In office patient states she has been very nauseous with severe abdominal pain, vomitting multiple times during the day. Patient appears unwell and pale, referred to the ED For hydration, bloodwork and CT Abdomen with IV/Oral contrast. Upon exam patient appears in discomfort, states pain on mildly controlled typically takes 1-1.5mg of dilaudid for her pain. Patient able to tolerate PO contrast. Patient otherwise denies dizziness, vision changes, chest pain, palpitations, shortness of breath, cough, n/v/d, extremity swelling, calf tenderness, sick contacts.     Patient not currently vaccinated, awaiting COVID swab.  (29 Jul 2021 16:39)        INTERVAL EVENTS:    SUBJECTIVE HPI: Patient seen and examined at bedside. Patient resting comfortably, no complaints at this time. Patient denies: fever, chills, weakness, dizziness, headaches, changes in vision, chest pain, palpitations, shortness of breath, cough, N/V, diarrhea or constipation, dysuria, LE edema. ROS otherwise negative.      PAST MEDICAL & SURGICAL HISTORY:  HTN (hypertension)    HLD (hyperlipidemia)    History of COVID-19  1/2021    History of TIAs  2014    S/P cholecystectomy    History of repair of hiatal hernia  2010        FAMILY HISTORY:      SOCIAL HISTORY:     -Cigarrettes:     -Alcohol:     -Ilicit Drug Use:    Home Medications:  amLODIPine 2.5 mg oral tablet: 1 tab(s) orally once a day (29 Jul 2021 18:08)  cetirizine 10 mg oral tablet: 1 tab(s) orally once a day (29 Jul 2021 18:08)  citalopram 20 mg oral tablet: 1 tab(s) orally once a day (29 Jul 2021 18:08)  clonazePAM 1 mg oral tablet: orally once a day (at bedtime), As Needed (29 Jul 2021 18:08)  cyclobenzaprine 10 mg oral tablet: 1 tab(s) orally 3 times a day, As Needed (29 Jul 2021 18:08)  Dexilant 60 mg oral delayed release capsule: 1 cap(s) orally once a day (29 Jul 2021 18:08)  dicyclomine 20 mg oral tablet: 1 tab(s) orally 4 times a day, As Needed (29 Jul 2021 18:08)  Imitrex 100 mg oral tablet: 1 tab(s) orally once, As Needed (29 Jul 2021 18:08)  losartan 50 mg oral tablet: 1 tab(s) orally once a day (29 Jul 2021 18:08)  Zofran 4 mg oral tablet: 1 tab(s) orally every 8 hours, As Needed (29 Jul 2021 18:08)  zolpidem 10 mg oral tablet: 1 tab(s) orally once a day (at bedtime) (29 Jul 2021 18:08)      MEDICATIONS  (STANDING):  amLODIPine   Tablet 2.5 milliGRAM(s) Oral daily  bisacodyl 5 milliGRAM(s) Oral every 12 hours  citalopram 20 milliGRAM(s) Oral daily  heparin   Injectable 5000 Unit(s) SubCutaneous every 8 hours  losartan 50 milliGRAM(s) Oral daily  ondansetron Injectable 4 milliGRAM(s) IV Push every 6 hours  pantoprazole  Injectable 40 milliGRAM(s) IV Push every 12 hours  polyethylene glycol 3350 17 Gram(s) Oral daily  senna 2 Tablet(s) Oral at bedtime  sodium chloride 0.9% lock flush 3 milliLiter(s) IV Push every 8 hours  sodium chloride 0.9%. 1000 milliLiter(s) (75 mL/Hr) IV Continuous <Continuous>    MEDICATIONS  (PRN):  acetaminophen   Tablet .. 650 milliGRAM(s) Oral every 6 hours PRN Severe Pain (7 - 10)  aluminum hydroxide/magnesium hydroxide/simethicone Suspension 30 milliLiter(s) Oral every 4 hours PRN Dyspepsia  clonazePAM  Tablet 1 milliGRAM(s) Oral at bedtime PRN agitation/ insomnia  cyclobenzaprine 10 milliGRAM(s) Oral three times a day PRN Muscle Spasm  zolpidem 5 milliGRAM(s) Oral at bedtime PRN Insomnia      VITAL SIGNS:  Vital Signs Last 24 Hrs  T(C): 36.1 (02 Aug 2021 13:37), Max: 36.8 (01 Aug 2021 14:12)  T(F): 97 (02 Aug 2021 13:37), Max: 98.3 (01 Aug 2021 14:12)  HR: 92 (02 Aug 2021 09:31) (78 - 94)  BP: 134/89 (02 Aug 2021 09:31) (109/58 - 138/86)  BP(mean): 106 (02 Aug 2021 09:31) (76 - 106)  RR: 16 (02 Aug 2021 09:31) (16 - 19)  SpO2: 98% (02 Aug 2021 09:31) (96% - 98%)    I&O's Detail    01 Aug 2021 07:01  -  02 Aug 2021 07:00  --------------------------------------------------------  IN:    IV PiggyBack: 200 mL    Oral Fluid: 120 mL    sodium chloride 0.9%: 1650 mL  Total IN: 1970 mL    OUT:    Voided (mL): 1550 mL  Total OUT: 1550 mL    Total NET: 420 mL      02 Aug 2021 07:01  -  02 Aug 2021 13:47  --------------------------------------------------------  IN:  Total IN: 0 mL    OUT:    Voided (mL): 600 mL  Total OUT: 600 mL    Total NET: -600 mL          PHYSICAL EXAM:  General: Comfortable, pleasant/anxious/agitated, Ill-appearing, well-nourished/frail/cachectic, comfortable / in distress  Neurological: AAOx3, no focal deficits  HEENT: NC/AT; EOMI, PERRL, clear conjunctiva, no nasal or oropharyngeal discharge or exudates, MMM  Neck: supple, no cervical or post-auricular lymphadenopathy  Cardiovascular: +S1/S2, no murmurs/rubs/gallops, RRR  Respiratory: CTA B/L, no diminished breath sounds, no wheezes/rales/rhonchi, no increased work of breathing or accessory muscle use  Gastrointestinal: soft, NT/ND; active BSx4 quadrants  Genitourinary: no suprapubic tenderness, no CVA tenderness  Extremities: WWP; no edema, clubbing or cyanosis  Vascular: 2+ radial, DP/PT pulses B/L  Skin: no rashes  Lines/Drains:     LABS:                        11.5   4.02  )-----------( 211      ( 02 Aug 2021 06:26 )             33.5     08-02    140  |  108  |  8   ----------------------------<  66<L>  3.7   |  16<L>  |  0.60    Ca    9.2      02 Aug 2021 06:26  Mg     1.8     08-02    TPro  6.0  /  Alb  3.8  /  TBili  0.4  /  DBili  x   /  AST  14  /  ALT  14  /  AlkPhos  116  08-02            BNP            Microbiology:        RADIOLOGY & ADDITIONAL STUDIES: Reviewed. [place] all pertinent systems normal

## 2021-12-10 NOTE — ED ADULT NURSE NOTE - EXTENSIONS OF SELF_ADULT
PA completed for Icosaoent Ethyl 1GM Capsule. Waiting for a response to determine if it was approved.  [id number]  
None

## 2022-04-21 NOTE — PROGRESS NOTE ADULT - PROBLEM SELECTOR PROBLEM 1
Addended by: Joan Hancock on: 4/21/2022 03:04 PM     Modules accepted: Orders
Spontaneous pneumothorax

## 2022-10-21 NOTE — ED PROVIDER NOTE - DATA REVIEWED, MDM
Apixaban/Eliquis increases your risk for bleeding. Notify your doctor if you experience any of the following side effects: bleeding, coughing or vomiting blood, red or black stool, unexpected pain or swelling, itching or hives, chest pain, chest tightness, trouble breathing, changes in how much or how often you urinate, red or pink urine, numbness or tingling in your feet, or unusual muscle weakness. When Apixaban/Eliquis is taken with other medicines, they can affect how it works. Taking other medications such as aspirin, blood thinners, nonsteroidal anti-inflammatories, and medications that treat depression can increase your risk of bleeding. It is very important to tell your health care provider about all of the other medicines, including over-the-counter medications, herbs, and vitamins you are taking. DO NOT start, stop, or change the dosage of any medicine, including over-the-counter medicines, vitamins, and herbal products without your doctor’s approval. Any products containing aspirin or are nonsteroidal anti-inflammatories lessen the blood’s ability to form clots and add to the effect of Apixaban/Eliquis. Never take aspirin or medicines that contain aspirin without speaking to your doctor. vital signs

## 2022-12-22 NOTE — ED PROVIDER NOTE - ATTESTATION, MLM
Dr. Garrido. Please see message below.     Report from Dr. Ni's office was placed in your basket.    I have reviewed and confirmed nurses' notes for patient's medications, allergies, medical history, and surgical history.

## 2023-03-03 NOTE — ED ADULT NURSE NOTE - CARDIO ASSESSMENT
WDL [Normal] : soft, non-tender, non-distended, no masses palpated, no HSM and normal bowel sounds detailed exam

## 2023-03-14 NOTE — ED ADULT NURSE NOTE - NSIMPLEMENTINTERV_GEN_ALL_ED
PA completed.  Await outcome Implemented All Universal Safety Interventions:  [place] to call system. Call bell, personal items and telephone within reach. Instruct patient to call for assistance. Room bathroom lighting operational. Non-slip footwear when patient is off stretcher. Physically safe environment: no spills, clutter or unnecessary equipment. Stretcher in lowest position, wheels locked, appropriate side rails in place.

## 2023-04-04 NOTE — ED ADULT TRIAGE NOTE - ACCOMPANIED BY
I called the patient back, she can return the monitor and notify the company so they can end service. Follow up with [facility] as she has transferred care.    Spouse/Significant other

## 2023-06-28 NOTE — ED ADULT NURSE NOTE - CHPI ED NUR SEVERITY2
Spoke w [facility] regarding more frequent apts for Jaqueline. They said she was only on the schedule for nxt Saturday & didn't have any further apts booked-unsure why. Charge RN I spoke w stated they had an opening today last minute and would reach out. Also asked about Home Infusion options for increased access & adherence. Asked if that is discussed per [facility] staff w Pt or how. RN stated the team typically asks the Pt after confirming insurance coverage. RN stated they would check w team and have them reach out to Pt about today's opening, scheduling more apts (2+/week) going forward & home infusion options.  Mayelin QURESHI, CNM     PAIN SCALE 5 OF 10.

## 2024-01-22 NOTE — ED ADULT TRIAGE NOTE - BMI (KG/M2)
"Chief Complaints and History of Present Illnesses   Patient presents with    Consult For     Patient present today for a lid evaluation by recommendation of Madiha Eubanks     Chief Complaint(s) and History of Present Illness(es)       Consult For              Laterality: both eyes    Associated symptoms: dryness.  Negative for eye pain and discharge    Treatments tried: artificial tears    Pain scale: 0/10    Comments: Patient present today for a lid evaluation by recommendation of Madiha Eubanks              Comments    Was told that more than an excess skin issue with lids, that the muscles need to be lifted along with the lids. Patient states she hope to improve her vision each eye. The past 8+ year lids have gotten progressively worse with coming down further and impacting vision each eye. Denies any headache but does find neck aches from having to accommodate due to lids.     Report also treating now for a condition she states is a possible blocked \" Oil gland\". She states this also causing blurry vision. She finished a steriod drops for this and still now just taking oral Doxy.     [place] Martínez, COT COT 12:01 PM January 22, 2024                        "
28.8

## 2024-02-01 NOTE — ED PROVIDER NOTE - RATE
How Severe Are Your Spot(S)?: mild What Type Of Note Output Would You Prefer (Optional)?: Standard Output What Is The Reason For Today's Visit?: Annual Full Body Skin Examination with No Concerns What Is The Reason For Today's Visit? (Being Monitored For X): the re-examination of lesions previously examined 71

## 2024-04-06 NOTE — ED ADULT NURSE NOTE - CHIEF COMPLAINT QUOTE
Pt to ER with cough and low grade fever x 1 day, pt HIV positive s/p left blebectomy and pleurectomy
Male

## 2024-04-08 NOTE — ED ADULT TRIAGE NOTE - HEART RATE (BEATS/MIN)
[Right] : right elbow 82 [There are no fractures, subluxations or dislocations. No significant abnormalities are seen] : There are no fractures, subluxations or dislocations. No significant abnormalities are seen [] : no swelling [de-identified] : tender cubital tunnel [FreeTextEntry9] : FROM [de-identified] : good strength 1st dorsal interosseous [FreeTextEntry1] : olecranon spur

## 2024-06-25 NOTE — ED PROVIDER NOTE - ENMT NEGATIVE STATEMENT, MLM
[facility] called and [employer] ordered 2 prescriptions for the patient yesterday and the pharmacy said it would need to be resent and to say zero mj for the insurance to cover it.  If there are any questions they can be reached at [phone] . Thank you    Ears: no ear pain and no hearing problems.Nose: no nasal congestion and no nasal drainage.Mouth/Throat: no dysphagia, no hoarseness and no throat pain.Neck: no lumps, no pain, no stiffness and no swollen glands.

## 2025-02-12 NOTE — ED ADULT TRIAGE NOTE - ESI TRIAGE ACUITY LEVEL, MLM
3
Detail Level: Detailed
Quality 47: Advance Care Plan: Advance Care Planning discussed and documented; advance care plan or surrogate decision maker documented in the medical record.
Quality 226: Preventive Care And Screening: Tobacco Use: Screening And Cessation Intervention: Patient screened for tobacco use and is an ex/non-smoker

## 2025-03-31 NOTE — ED PROVIDER NOTE - CROS ED PSYCH ALL NEG
Detail Level: Zone Render In Strict Bullet Format?: No Continue Regimen: Taltz Autoinjector 80 mg/mL subcutaneous every 2 weeks (week 2-12), then 80mg every 4 weeks Initiate Treatment: spironolactone 50 mg tablet Continue Regimen: tretinoin 0.05 % topical cream negative... 24-May-2019 20:32

## 2025-05-12 NOTE — ED ADULT NURSE NOTE - NS ED NURSE DISCH DISPOSITION
Received fax refill request for Jardiance 25 MG tablet  from [facility]. Encounter created to queue up refill protocol.     Discharged

## 2025-07-15 NOTE — ED PROVIDER NOTE - OBJECTIVE STATEMENT
The lesion was dilated with multiple inflations. 60yo M hx of HIV (CD4 >800, per pt), hx of spontaneous PTX, presents with intermittent sharp LUQ abd pain.  States he first had episode of pain 3 mo ago, pain was triggered by twisting to one side, felt like sharp tearing pain to abdominal wall, lasted approx 30 seconds, then resolved.  Pt saw PMD who referred him to surgeon for eval for possible hernia; pt had abd US done to eval for hernia (?) which was negative.  Pt had two other similar episodes since then, once last week, and once again last night, both when twisting, with pain described as tearing sensation in abdominal wall in LUQ.  No associated fever, chills, nausea, vomiting, or diarrhea.  no dysuria or hematuria.  No CP or SOB.  No pleurisy.